# Patient Record
Sex: FEMALE | Race: BLACK OR AFRICAN AMERICAN
[De-identification: names, ages, dates, MRNs, and addresses within clinical notes are randomized per-mention and may not be internally consistent; named-entity substitution may affect disease eponyms.]

---

## 2017-03-13 ENCOUNTER — APPOINTMENT (OUTPATIENT)
Dept: OPHTHALMOLOGY | Facility: CLINIC | Age: 72
End: 2017-03-13

## 2017-03-13 DIAGNOSIS — H53.2 DIPLOPIA: ICD-10-CM

## 2017-03-29 ENCOUNTER — APPOINTMENT (OUTPATIENT)
Dept: OPHTHALMOLOGY | Facility: CLINIC | Age: 72
End: 2017-03-29

## 2017-04-03 ENCOUNTER — APPOINTMENT (OUTPATIENT)
Dept: GASTROENTEROLOGY | Facility: CLINIC | Age: 72
End: 2017-04-03

## 2017-04-03 VITALS
WEIGHT: 111 LBS | HEIGHT: 66 IN | BODY MASS INDEX: 17.84 KG/M2 | TEMPERATURE: 98.1 F | HEART RATE: 107 BPM | OXYGEN SATURATION: 99 % | DIASTOLIC BLOOD PRESSURE: 70 MMHG | SYSTOLIC BLOOD PRESSURE: 150 MMHG

## 2017-04-03 DIAGNOSIS — J34.9 UNSPECIFIED DISORDER OF NOSE AND NASAL SINUSES: ICD-10-CM

## 2017-04-03 DIAGNOSIS — K30 FUNCTIONAL DYSPEPSIA: ICD-10-CM

## 2017-04-03 DIAGNOSIS — Z83.3 FAMILY HISTORY OF DIABETES MELLITUS: ICD-10-CM

## 2017-04-03 DIAGNOSIS — Z83.79 FAMILY HISTORY OF OTHER DISEASES OF THE DIGESTIVE SYSTEM: ICD-10-CM

## 2017-04-03 DIAGNOSIS — Z83.518 FAMILY HISTORY OF OTHER SPECIFIED EYE DISORDER: ICD-10-CM

## 2017-04-03 DIAGNOSIS — Z63.4 DISAPPEARANCE AND DEATH OF FAMILY MEMBER: ICD-10-CM

## 2017-04-03 DIAGNOSIS — Z83.511 FAMILY HISTORY OF GLAUCOMA: ICD-10-CM

## 2017-04-03 DIAGNOSIS — Z80.9 FAMILY HISTORY OF MALIGNANT NEOPLASM, UNSPECIFIED: ICD-10-CM

## 2017-04-03 RX ORDER — OXCARBAZEPINE 600 MG/1
600 TABLET, FILM COATED ORAL
Refills: 0 | Status: ACTIVE | COMMUNITY

## 2017-04-03 RX ORDER — OXYCODONE AND ACETAMINOPHEN 5; 325 MG/1; MG/1
5-325 TABLET ORAL
Refills: 0 | Status: ACTIVE | COMMUNITY

## 2017-04-03 SDOH — SOCIAL STABILITY - SOCIAL INSECURITY: DISSAPEARANCE AND DEATH OF FAMILY MEMBER: Z63.4

## 2017-04-05 ENCOUNTER — OTHER (OUTPATIENT)
Age: 72
End: 2017-04-05

## 2017-04-11 ENCOUNTER — FORM ENCOUNTER (OUTPATIENT)
Age: 72
End: 2017-04-11

## 2017-04-12 ENCOUNTER — APPOINTMENT (OUTPATIENT)
Dept: NUCLEAR MEDICINE | Facility: HOSPITAL | Age: 72
End: 2017-04-12

## 2017-04-12 ENCOUNTER — OUTPATIENT (OUTPATIENT)
Dept: OUTPATIENT SERVICES | Facility: HOSPITAL | Age: 72
LOS: 1 days | End: 2017-04-12

## 2017-04-12 DIAGNOSIS — K80.20 CALCULUS OF GALLBLADDER WITHOUT CHOLECYSTITIS WITHOUT OBSTRUCTION: ICD-10-CM

## 2017-04-27 ENCOUNTER — APPOINTMENT (OUTPATIENT)
Dept: GASTROENTEROLOGY | Facility: AMBULATORY MEDICAL SERVICES | Age: 72
End: 2017-04-27

## 2017-06-08 ENCOUNTER — APPOINTMENT (OUTPATIENT)
Dept: GASTROENTEROLOGY | Facility: AMBULATORY MEDICAL SERVICES | Age: 72
End: 2017-06-08

## 2017-06-26 ENCOUNTER — APPOINTMENT (OUTPATIENT)
Dept: GASTROENTEROLOGY | Facility: CLINIC | Age: 72
End: 2017-06-26

## 2017-06-26 ENCOUNTER — APPOINTMENT (OUTPATIENT)
Dept: OPHTHALMOLOGY | Facility: CLINIC | Age: 72
End: 2017-06-26

## 2017-06-26 RX ORDER — ASPIRIN 81 MG/1
81 TABLET, CHEWABLE ORAL
Qty: 30 | Refills: 0 | Status: ACTIVE | COMMUNITY
Start: 2017-02-08

## 2017-06-26 RX ORDER — PREGABALIN 50 MG/1
50 CAPSULE ORAL
Qty: 42 | Refills: 0 | Status: ACTIVE | COMMUNITY
Start: 2017-01-11

## 2017-07-10 ENCOUNTER — APPOINTMENT (OUTPATIENT)
Dept: GASTROENTEROLOGY | Facility: CLINIC | Age: 72
End: 2017-07-10

## 2017-07-10 VITALS
DIASTOLIC BLOOD PRESSURE: 72 MMHG | SYSTOLIC BLOOD PRESSURE: 130 MMHG | HEIGHT: 66 IN | TEMPERATURE: 98.6 F | BODY MASS INDEX: 17.68 KG/M2 | OXYGEN SATURATION: 98 % | HEART RATE: 71 BPM | WEIGHT: 110 LBS

## 2017-08-25 ENCOUNTER — OUTPATIENT (OUTPATIENT)
Dept: OUTPATIENT SERVICES | Facility: HOSPITAL | Age: 72
LOS: 1 days | Discharge: HOME | End: 2017-08-25

## 2017-08-25 ENCOUNTER — APPOINTMENT (OUTPATIENT)
Dept: SPEECH THERAPY | Facility: CLINIC | Age: 72
End: 2017-08-25

## 2017-09-06 ENCOUNTER — APPOINTMENT (OUTPATIENT)
Dept: SPEECH THERAPY | Facility: CLINIC | Age: 72
End: 2017-09-06

## 2017-09-13 ENCOUNTER — APPOINTMENT (OUTPATIENT)
Dept: SPEECH THERAPY | Facility: CLINIC | Age: 72
End: 2017-09-13

## 2017-09-20 ENCOUNTER — APPOINTMENT (OUTPATIENT)
Dept: SPEECH THERAPY | Facility: CLINIC | Age: 72
End: 2017-09-20

## 2017-09-27 ENCOUNTER — APPOINTMENT (OUTPATIENT)
Dept: SPEECH THERAPY | Facility: CLINIC | Age: 72
End: 2017-09-27

## 2017-10-02 ENCOUNTER — RX RENEWAL (OUTPATIENT)
Age: 72
End: 2017-10-02

## 2017-10-04 ENCOUNTER — APPOINTMENT (OUTPATIENT)
Dept: SPEECH THERAPY | Facility: CLINIC | Age: 72
End: 2017-10-04

## 2017-10-05 DIAGNOSIS — R49.0 DYSPHONIA: ICD-10-CM

## 2017-10-09 ENCOUNTER — APPOINTMENT (OUTPATIENT)
Dept: GASTROENTEROLOGY | Facility: CLINIC | Age: 72
End: 2017-10-09
Payer: MEDICARE

## 2017-10-09 VITALS
OXYGEN SATURATION: 98 % | SYSTOLIC BLOOD PRESSURE: 150 MMHG | DIASTOLIC BLOOD PRESSURE: 70 MMHG | TEMPERATURE: 98.5 F | HEART RATE: 84 BPM | HEIGHT: 66 IN | BODY MASS INDEX: 18.64 KG/M2 | WEIGHT: 116 LBS

## 2017-10-09 DIAGNOSIS — M26.629 ARTHRALGIA OF TEMPOROMANDIBULAR JOINT,: ICD-10-CM

## 2017-10-09 PROCEDURE — 99214 OFFICE O/P EST MOD 30 MIN: CPT

## 2017-10-11 ENCOUNTER — APPOINTMENT (OUTPATIENT)
Dept: SPEECH THERAPY | Facility: CLINIC | Age: 72
End: 2017-10-11

## 2017-10-17 ENCOUNTER — OUTPATIENT (OUTPATIENT)
Dept: OUTPATIENT SERVICES | Facility: HOSPITAL | Age: 72
LOS: 1 days | End: 2017-10-17
Payer: MEDICARE

## 2017-10-17 DIAGNOSIS — G35 MULTIPLE SCLEROSIS: ICD-10-CM

## 2017-10-17 LAB
CLARITY CSF: CLEAR — SIGNIFICANT CHANGE UP
COLOR CSF: COLORLESS — SIGNIFICANT CHANGE UP
GLUCOSE CSF-MCNC: 62 MG/DL — SIGNIFICANT CHANGE UP (ref 40–70)
GRAM STN CSF: SIGNIFICANT CHANGE UP
NRBC NFR CSF: < 1 CELL/UL — SIGNIFICANT CHANGE UP (ref 0–5)
PROT CSF-MCNC: 28.3 MG/DL — SIGNIFICANT CHANGE UP (ref 15–45)
RBC # CSF: 4 CELL/UL — HIGH (ref 0–0)
SPECIMEN SOURCE: SIGNIFICANT CHANGE UP
XANTHOCHROMIA: SIGNIFICANT CHANGE UP

## 2017-10-17 PROCEDURE — 62270 DX LMBR SPI PNXR: CPT

## 2017-10-17 PROCEDURE — 77003 FLUOROGUIDE FOR SPINE INJECT: CPT | Mod: 26,GC

## 2017-10-18 ENCOUNTER — APPOINTMENT (OUTPATIENT)
Dept: SPEECH THERAPY | Facility: CLINIC | Age: 72
End: 2017-10-18

## 2017-10-19 DIAGNOSIS — R53.1 WEAKNESS: ICD-10-CM

## 2017-10-19 LAB — ACE SERPL-CCNC: 37 U/L — SIGNIFICANT CHANGE UP (ref 14–82)

## 2017-10-22 LAB — BACTERIA CSF CULT: SIGNIFICANT CHANGE UP

## 2017-10-23 ENCOUNTER — APPOINTMENT (OUTPATIENT)
Dept: SPEECH THERAPY | Facility: HOSPITAL | Age: 72
End: 2017-10-23
Payer: MEDICARE

## 2017-10-23 ENCOUNTER — APPOINTMENT (OUTPATIENT)
Dept: RADIOLOGY | Facility: HOSPITAL | Age: 72
End: 2017-10-23
Payer: MEDICARE

## 2017-10-23 ENCOUNTER — OUTPATIENT (OUTPATIENT)
Dept: OUTPATIENT SERVICES | Facility: HOSPITAL | Age: 72
LOS: 1 days | End: 2017-10-23

## 2017-10-23 DIAGNOSIS — R13.10 DYSPHAGIA, UNSPECIFIED: ICD-10-CM

## 2017-10-23 PROCEDURE — 74230 X-RAY XM SWLNG FUNCJ C+: CPT | Mod: 26

## 2017-10-24 LAB
MISCELLANEOUS - CHEM: SIGNIFICANT CHANGE UP
MISCELLANEOUS - CHEM: SIGNIFICANT CHANGE UP
OLIGOCLONAL BANDS CSF ELPH-IMP: SIGNIFICANT CHANGE UP

## 2017-11-01 ENCOUNTER — APPOINTMENT (OUTPATIENT)
Dept: SPEECH THERAPY | Facility: CLINIC | Age: 72
End: 2017-11-01

## 2017-11-08 ENCOUNTER — APPOINTMENT (OUTPATIENT)
Dept: SPEECH THERAPY | Facility: CLINIC | Age: 72
End: 2017-11-08

## 2017-11-14 DIAGNOSIS — R13.13 DYSPHAGIA, PHARYNGEAL PHASE: ICD-10-CM

## 2017-11-15 ENCOUNTER — APPOINTMENT (OUTPATIENT)
Dept: SPEECH THERAPY | Facility: CLINIC | Age: 72
End: 2017-11-15

## 2017-11-29 ENCOUNTER — APPOINTMENT (OUTPATIENT)
Dept: SPEECH THERAPY | Facility: CLINIC | Age: 72
End: 2017-11-29

## 2017-12-06 ENCOUNTER — APPOINTMENT (OUTPATIENT)
Dept: SPEECH THERAPY | Facility: CLINIC | Age: 72
End: 2017-12-06

## 2017-12-20 ENCOUNTER — APPOINTMENT (OUTPATIENT)
Dept: SPEECH THERAPY | Facility: CLINIC | Age: 72
End: 2017-12-20

## 2018-01-03 ENCOUNTER — APPOINTMENT (OUTPATIENT)
Dept: SPEECH THERAPY | Facility: CLINIC | Age: 73
End: 2018-01-03

## 2018-01-07 ENCOUNTER — EMERGENCY (EMERGENCY)
Facility: HOSPITAL | Age: 73
LOS: 0 days | Discharge: HOME | End: 2018-01-07
Attending: EMERGENCY MEDICINE
Payer: MEDICARE

## 2018-01-07 VITALS
SYSTOLIC BLOOD PRESSURE: 175 MMHG | DIASTOLIC BLOOD PRESSURE: 78 MMHG | HEART RATE: 67 BPM | OXYGEN SATURATION: 98 % | TEMPERATURE: 97 F | RESPIRATION RATE: 15 BRPM

## 2018-01-07 VITALS
DIASTOLIC BLOOD PRESSURE: 67 MMHG | TEMPERATURE: 98 F | OXYGEN SATURATION: 99 % | SYSTOLIC BLOOD PRESSURE: 146 MMHG | RESPIRATION RATE: 18 BRPM | HEART RATE: 64 BPM | WEIGHT: 113.98 LBS | HEIGHT: 66 IN

## 2018-01-07 DIAGNOSIS — R42 DIZZINESS AND GIDDINESS: ICD-10-CM
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
## 2018-01-07 DIAGNOSIS — S09.90XA UNSPECIFIED INJURY OF HEAD, INITIAL ENCOUNTER: ICD-10-CM
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
## 2018-01-07 PROCEDURE — 99284 EMERGENCY DEPT VISIT MOD MDM: CPT

## 2018-01-07 PROCEDURE — 70450 CT HEAD/BRAIN W/O DYE: CPT | Mod: 26

## 2018-01-07 NOTE — ED PROVIDER NOTE - PHYSICAL EXAMINATION
Gen: Alert, NAD Head: NC, AT, PERRL, EOMI, normal lids/conjunctiva ENT:normal hearing, patent oropharynx without erythema/exudate, uvula midline Neck: +supple, no tenderness/meningismus/JVD, +Trachea midline Pulm: Bilateral BS, normal resp effort, no wheeze/stridor/retractions CV: RRR, no M/R/G, +dist pulses Abd: soft, NT/ND, +BS, no hepatosplenomegaly Mskel: no edema/erythema/cyanosis Skin: no rash Neuro: AAOx3, no sensory/motor deficits, CN 2-12 intact

## 2018-01-07 NOTE — ED ADULT TRIAGE NOTE - CHIEF COMPLAINT QUOTE
c/o dizziness s/p fell out of bed hit forehead on nightstand denies loc denies n/v no obvious injury noted to area denies pain at present denies anticoagulation use

## 2018-01-07 NOTE — ED ADULT NURSE REASSESSMENT NOTE - NS ED NURSE REASSESS COMMENT FT1
Pt denies dizziness at this time; noted with steady gait. Family member with pt. States she feels better.

## 2018-01-07 NOTE — ED ADULT NURSE NOTE - CHPI ED SYMPTOMS NEG
no tingling/no abrasion/no bleeding/no fever/no weakness/no confusion/no deformity/no vomiting/no loss of consciousness/no numbness

## 2018-01-07 NOTE — ED PROVIDER NOTE - MEDICAL DECISION MAKING DETAILS
Patient pnrests with head injury.  pending Ct.  Patient signed out to incoming physician.  All decisions regarding the progression of care will be made at their discretion.

## 2018-01-07 NOTE — ED PROVIDER NOTE - PROGRESS NOTE DETAILS
endorsed by dr. austin pending CT. CT scan demonstrates no acute pathology. pt well appeairng Discussed results and outcome of testing with the patient.  Patient given copy of available results. Patient advised to please follow up with their PMD within the next 24 hours and return to the Emergency Department for worsening symptoms or any other concerns.

## 2018-01-07 NOTE — ED PROVIDER NOTE - OBJECTIVE STATEMENT
Pertinent PMH/PSH/FHx/SHx and Review of Systems contained within:  Patient presents to the ED for head injury.  no PMH.  Patient states she "rolled over in her sleep and struck her head against the night stand and woke up."  VSS.  occcured early this morning.  now through the day "feels foggy and uneasy." As interpreted by ED physician, ECG is NSR with normal intervals/axis, no changes in QRS, no ST/T changes. Non toxic.  Well appearing. No aggravating or relieving factors. No other pertinent PMH.  No other pertinent PSH.  No other pertinent FHx.  Patient denies EtOH/tobacco/illicit substance use. No fever/chills, No photophobia/eye pain/changes in vision, No ear pain/sore throat/dysphagia, No chest pain/palpitations, no SOB/cough/wheeze/stridor, No abdominal pain, No N/V/D, no dysuria/frequency/discharge, No neck/back pain, no rash, no focal changes in neurological status/function.

## 2018-01-31 ENCOUNTER — APPOINTMENT (OUTPATIENT)
Dept: SPEECH THERAPY | Facility: CLINIC | Age: 73
End: 2018-01-31

## 2018-02-05 ENCOUNTER — RX RENEWAL (OUTPATIENT)
Age: 73
End: 2018-02-05

## 2018-02-05 RX ORDER — ESCITALOPRAM OXALATE 10 MG/1
10 TABLET ORAL
Qty: 30 | Refills: 3 | Status: ACTIVE | COMMUNITY
Start: 2017-07-10 | End: 1900-01-01

## 2018-04-09 ENCOUNTER — RX RENEWAL (OUTPATIENT)
Age: 73
End: 2018-04-09

## 2018-04-23 ENCOUNTER — APPOINTMENT (OUTPATIENT)
Dept: GASTROENTEROLOGY | Facility: CLINIC | Age: 73
End: 2018-04-23
Payer: MEDICARE

## 2018-04-23 VITALS
OXYGEN SATURATION: 98 % | WEIGHT: 116 LBS | SYSTOLIC BLOOD PRESSURE: 135 MMHG | HEIGHT: 66 IN | TEMPERATURE: 98.5 F | BODY MASS INDEX: 18.64 KG/M2 | DIASTOLIC BLOOD PRESSURE: 73 MMHG | RESPIRATION RATE: 16 BRPM | HEART RATE: 84 BPM

## 2018-04-23 DIAGNOSIS — Z12.11 ENCOUNTER FOR SCREENING FOR MALIGNANT NEOPLASM OF COLON: ICD-10-CM

## 2018-04-23 PROCEDURE — 99214 OFFICE O/P EST MOD 30 MIN: CPT

## 2018-04-23 RX ORDER — GABAPENTIN 100 MG
100 TABLET ORAL
Refills: 0 | Status: ACTIVE | COMMUNITY

## 2018-04-23 RX ORDER — CELECOXIB 50 MG/1
CAPSULE ORAL
Refills: 0 | Status: ACTIVE | COMMUNITY

## 2018-04-23 RX ORDER — SODIUM SULFATE, POTASSIUM SULFATE, MAGNESIUM SULFATE 17.5; 3.13; 1.6 G/ML; G/ML; G/ML
17.5-3.13-1.6 SOLUTION, CONCENTRATE ORAL
Qty: 1 | Refills: 0 | Status: ACTIVE | COMMUNITY
Start: 2018-04-23 | End: 1900-01-01

## 2018-05-10 ENCOUNTER — APPOINTMENT (OUTPATIENT)
Dept: GASTROENTEROLOGY | Facility: AMBULATORY MEDICAL SERVICES | Age: 73
End: 2018-05-10
Payer: MEDICARE

## 2018-05-10 PROCEDURE — 45385 COLONOSCOPY W/LESION REMOVAL: CPT | Mod: PT

## 2018-05-10 PROCEDURE — 45381 COLONOSCOPY SUBMUCOUS NJX: CPT | Mod: PT

## 2018-05-30 ENCOUNTER — APPOINTMENT (OUTPATIENT)
Dept: GASTROENTEROLOGY | Facility: CLINIC | Age: 73
End: 2018-05-30
Payer: MEDICARE

## 2018-05-30 VITALS
RESPIRATION RATE: 16 BRPM | TEMPERATURE: 98.3 F | BODY MASS INDEX: 18.64 KG/M2 | WEIGHT: 116 LBS | HEIGHT: 66 IN | OXYGEN SATURATION: 99 % | DIASTOLIC BLOOD PRESSURE: 73 MMHG | SYSTOLIC BLOOD PRESSURE: 138 MMHG | HEART RATE: 71 BPM

## 2018-05-30 DIAGNOSIS — E87.1 HYPO-OSMOLALITY AND HYPONATREMIA: ICD-10-CM

## 2018-05-30 PROCEDURE — 99214 OFFICE O/P EST MOD 30 MIN: CPT

## 2018-05-30 RX ORDER — GABAPENTIN 100 MG/1
100 CAPSULE ORAL
Qty: 90 | Refills: 0 | Status: ACTIVE | COMMUNITY
Start: 2018-01-18

## 2018-05-30 RX ORDER — ESCITALOPRAM OXALATE 20 MG/1
20 TABLET ORAL
Qty: 90 | Refills: 0 | Status: ACTIVE | COMMUNITY
Start: 2018-02-27

## 2018-05-30 RX ORDER — SULFAMETHOXAZOLE AND TRIMETHOPRIM 800; 160 MG/1; MG/1
800-160 TABLET ORAL
Qty: 20 | Refills: 0 | Status: ACTIVE | COMMUNITY
Start: 2018-05-02

## 2018-05-30 RX ORDER — CELECOXIB 100 MG/1
100 CAPSULE ORAL
Qty: 30 | Refills: 0 | Status: ACTIVE | COMMUNITY
Start: 2018-03-19

## 2018-05-30 RX ORDER — SIMVASTATIN 10 MG/1
10 TABLET, FILM COATED ORAL
Qty: 90 | Refills: 0 | Status: ACTIVE | COMMUNITY
Start: 2018-01-04

## 2018-07-23 ENCOUNTER — APPOINTMENT (OUTPATIENT)
Dept: GASTROENTEROLOGY | Facility: CLINIC | Age: 73
End: 2018-07-23
Payer: MEDICARE

## 2018-07-23 VITALS
BODY MASS INDEX: 18.48 KG/M2 | DIASTOLIC BLOOD PRESSURE: 85 MMHG | TEMPERATURE: 98.5 F | WEIGHT: 115 LBS | HEIGHT: 66 IN | RESPIRATION RATE: 16 BRPM | OXYGEN SATURATION: 96 % | SYSTOLIC BLOOD PRESSURE: 155 MMHG | HEART RATE: 84 BPM

## 2018-07-23 PROCEDURE — 99214 OFFICE O/P EST MOD 30 MIN: CPT

## 2018-10-22 ENCOUNTER — APPOINTMENT (OUTPATIENT)
Dept: GASTROENTEROLOGY | Facility: CLINIC | Age: 73
End: 2018-10-22
Payer: MEDICARE

## 2018-10-22 VITALS
BODY MASS INDEX: 21.53 KG/M2 | DIASTOLIC BLOOD PRESSURE: 82 MMHG | SYSTOLIC BLOOD PRESSURE: 150 MMHG | RESPIRATION RATE: 16 BRPM | HEIGHT: 66 IN | WEIGHT: 134 LBS | TEMPERATURE: 97.8 F | HEART RATE: 92 BPM | OXYGEN SATURATION: 98 %

## 2018-10-22 PROCEDURE — 99214 OFFICE O/P EST MOD 30 MIN: CPT

## 2018-11-26 ENCOUNTER — RX RENEWAL (OUTPATIENT)
Age: 73
End: 2018-11-26

## 2019-03-20 ENCOUNTER — EMERGENCY (EMERGENCY)
Facility: HOSPITAL | Age: 74
LOS: 0 days | Discharge: HOME | End: 2019-03-20
Attending: EMERGENCY MEDICINE
Payer: MEDICARE

## 2019-03-20 VITALS
DIASTOLIC BLOOD PRESSURE: 77 MMHG | TEMPERATURE: 98 F | RESPIRATION RATE: 18 BRPM | OXYGEN SATURATION: 99 % | HEART RATE: 88 BPM | HEIGHT: 66 IN | WEIGHT: 134.92 LBS | SYSTOLIC BLOOD PRESSURE: 178 MMHG
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
## 2019-03-20 DIAGNOSIS — S61.512A LACERATION WITHOUT FOREIGN BODY OF LEFT WRIST, INITIAL ENCOUNTER: ICD-10-CM
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
## 2019-03-20 PROCEDURE — 73110 X-RAY EXAM OF WRIST: CPT | Mod: 26,LT

## 2019-03-20 PROCEDURE — 12001 RPR S/N/AX/GEN/TRNK 2.5CM/<: CPT

## 2019-03-20 PROCEDURE — 99283 EMERGENCY DEPT VISIT LOW MDM: CPT | Mod: 25

## 2019-03-20 RX ORDER — SIMVASTATIN 20 MG/1
0 TABLET, FILM COATED ORAL
Qty: 0 | Refills: 0 | COMMUNITY

## 2019-03-20 RX ORDER — OMEPRAZOLE 10 MG/1
1 CAPSULE, DELAYED RELEASE ORAL
Qty: 0 | Refills: 0 | COMMUNITY

## 2019-03-20 RX ORDER — TETANUS TOXOID, REDUCED DIPHTHERIA TOXOID AND ACELLULAR PERTUSSIS VACCINE, ADSORBED 5; 2.5; 8; 8; 2.5 [IU]/.5ML; [IU]/.5ML; UG/.5ML; UG/.5ML; UG/.5ML
0.5 SUSPENSION INTRAMUSCULAR ONCE
Qty: 0 | Refills: 0 | Status: COMPLETED | OUTPATIENT
Start: 2019-03-20 | End: 2019-03-20

## 2019-03-20 RX ORDER — OXYCODONE AND ACETAMINOPHEN 5; 325 MG/1; MG/1
1 TABLET ORAL ONCE
Qty: 0 | Refills: 0 | Status: DISCONTINUED | OUTPATIENT
Start: 2019-03-20 | End: 2019-03-20

## 2019-03-20 RX ORDER — ESCITALOPRAM OXALATE 10 MG/1
0 TABLET, FILM COATED ORAL
Qty: 0 | Refills: 0 | COMMUNITY

## 2019-03-20 RX ADMIN — TETANUS TOXOID, REDUCED DIPHTHERIA TOXOID AND ACELLULAR PERTUSSIS VACCINE, ADSORBED 0.5 MILLILITER(S): 5; 2.5; 8; 8; 2.5 SUSPENSION INTRAMUSCULAR at 18:35

## 2019-03-20 NOTE — ED ADULT NURSE NOTE - OBJECTIVE STATEMENT
73 y.o female with a hx of trigeminal neuralgia and transient hypertension complains of laceration on the left wrist after washing dishes, laceration was from broken glass. patient is able to move bend and flex fingers. Denies numbness and tingling.

## 2019-03-20 NOTE — ED ADULT NURSE NOTE - NS ED NURSE DC INFO COMPLEXITY
Patient asked questions/Returned Demonstration/Simple: Patient demonstrates quick and easy understanding

## 2019-03-20 NOTE — ED ADULT NURSE NOTE - NSIMPLEMENTINTERV_GEN_ALL_ED
Implemented All Universal Safety Interventions:  [place] to call system. Call bell, personal items and telephone within reach. Instruct patient to call for assistance. Room bathroom lighting operational. Non-slip footwear when patient is off stretcher. Physically safe environment: no spills, clutter or unnecessary equipment. Stretcher in lowest position, wheels locked, appropriate side rails in place.

## 2019-03-20 NOTE — ED PROVIDER NOTE - PHYSICAL EXAMINATION
Gen: AAOx3, NAD, sitting comfortably in stretcher  Head: ncat, perrla, eomi b/l  Neck: supple, no lymphadenopathy, no midline deviation  Heart: rrr, no m/r/g  Lungs: CTA b/l, no rales/ronchi/wheezes  Abd: soft, nontender, non-distended, no rebound or guarding  Ext: no clubbing/cyanosis/edema  Neuro: sensation and muscle strength intact b/l, steady gait, normal rom of all digits of L hand and wrist, no active bleeding, no fb in wound after washout, normal cap refill throughout hand, normal color, sensation intact throughout all fingers and wrist  derm: 2 cm L wrist laceration anterior ulnar aspect, superficial, only sub-q visible, no muscles or tendons

## 2019-03-20 NOTE — ED PROVIDER NOTE - CARE PROVIDER_API CALL
Johnny Palmer (MD)  Plastic Surgery  [address] [place] [address], [address]  [place], [place] [id number]  Phone: [phone]  Fax: [phone]  Follow Up Time: 4-6 Days

## 2019-03-20 NOTE — ED PROVIDER NOTE - OBJECTIVE STATEMENT
74 yo F with laceration to L wrist s/p washing dishes.  Pt. cut wrist on piece of glass accidentally that she didn't know was already chipped.  Pt. denies other injury.  Incident happened within the last few hours.  No other complaints or trauma, no active bleeding currently.  ROS: negative for fever, cough, headache, chest pain, shortness of breath, abd pain, nausea, vomiting, diarrhea, rash, paresthesia, and weakness--all other systems reviewed are negative.   PMH: negative; Meds: Denies; SH: Denies smoking/drinking/drug use

## 2019-03-20 NOTE — ED PROVIDER NOTE - CLINICAL SUMMARY MEDICAL DECISION MAKING FREE TEXT BOX
74 yo F with L wrist lac, uncomplicated  -xray L wrist r/o FB  -washout and repair, d/c with pcp f/u

## 2019-03-20 NOTE — ED PROVIDER NOTE - PROGRESS NOTE DETAILS
Results reported to patient--grossly benign, xr show no bony trauma or FB  Pt. reports feeling better after meds, lac repaired  pt. agrees to f/u with primary care outpt.  pt. understands to return to ED if symptoms worsen; will d/c

## 2019-04-22 ENCOUNTER — APPOINTMENT (OUTPATIENT)
Dept: GASTROENTEROLOGY | Facility: CLINIC | Age: 74
End: 2019-04-22

## 2019-05-13 ENCOUNTER — APPOINTMENT (OUTPATIENT)
Dept: GASTROENTEROLOGY | Facility: CLINIC | Age: 74
End: 2019-05-13
Payer: MEDICARE

## 2019-05-13 VITALS
SYSTOLIC BLOOD PRESSURE: 130 MMHG | HEIGHT: 66 IN | BODY MASS INDEX: 22.66 KG/M2 | DIASTOLIC BLOOD PRESSURE: 70 MMHG | HEART RATE: 68 BPM | OXYGEN SATURATION: 98 % | TEMPERATURE: 98.6 F | WEIGHT: 141 LBS

## 2019-05-13 PROCEDURE — 99214 OFFICE O/P EST MOD 30 MIN: CPT

## 2019-05-13 RX ORDER — NEBIVOLOL HYDROCHLORIDE 10 MG/1
10 TABLET ORAL
Refills: 0 | Status: ACTIVE | COMMUNITY

## 2019-05-13 RX ORDER — OMEPRAZOLE 20 MG/1
20 CAPSULE, DELAYED RELEASE ORAL DAILY
Qty: 30 | Refills: 3 | Status: DISCONTINUED | COMMUNITY
Start: 2017-04-05 | End: 2019-05-13

## 2019-05-13 RX ORDER — SODIUM SULFATE, POTASSIUM SULFATE, MAGNESIUM SULFATE 17.5; 3.13; 1.6 G/ML; G/ML; G/ML
17.5-3.13-1.6 SOLUTION, CONCENTRATE ORAL
Qty: 1 | Refills: 0 | Status: ACTIVE | COMMUNITY
Start: 2019-05-13 | End: 1900-01-01

## 2019-05-13 NOTE — PHYSICAL EXAM
[General Appearance - In No Acute Distress] : in no acute distress [General Appearance - Alert] : alert [Extraocular Movements] : extraocular movements were intact [Sclera] : the sclera and conjunctiva were normal [PERRL With Normal Accommodation] : pupils were equal in size, round, and reactive to light [Oropharynx] : the oropharynx was normal [Outer Ear] : the ears and nose were normal in appearance [Neck Cervical Mass (___cm)] : no neck mass was observed [Neck Appearance] : the appearance of the neck was normal [Jugular Venous Distention Increased] : there was no jugular-venous distention [Thyroid Diffuse Enlargement] : the thyroid was not enlarged [Thyroid Nodule] : there were no palpable thyroid nodules [Auscultation Breath Sounds / Voice Sounds] : lungs were clear to auscultation bilaterally [Heart Rate And Rhythm] : heart rate was normal and rhythm regular [Heart Sounds] : normal S1 and S2 [Heart Sounds Gallop] : no gallops [Murmurs] : no murmurs [Heart Sounds Pericardial Friction Rub] : no pericardial rub [Bowel Sounds] : normal bowel sounds [Abdomen Soft] : soft [Abdomen Tenderness] : non-tender [] : no hepato-splenomegaly [Abdomen Mass (___ Cm)] : no abdominal mass palpated [Abnormal Walk] : normal gait [Nail Clubbing] : no clubbing  or cyanosis of the fingernails [No CVA Tenderness] : no ~M costovertebral angle tenderness [No Spinal Tenderness] : no spinal tenderness [Oriented To Time, Place, And Person] : oriented to person, place, and time [Musculoskeletal - Swelling] : no joint swelling seen [Motor Tone] : muscle strength and tone were normal [Impaired Insight] : insight and judgment were intact [Affect] : the affect was normal

## 2019-05-13 NOTE — HISTORY OF PRESENT ILLNESS
[FreeTextEntry1] : The patient is a 73-year-old female who is status post colonoscopy and polypectomy. She had a sessile polyp in the sigmoid which was removed with snare cautery. 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 This was found to be at least 2 mm away from the cautery line.\par The patient has infrequent episodes of some left-sided abdominal pain. Her bowel movements are fairly regular. She does have bouts of some constipation.\par She has a history of gallstones but they appear to be asymptomatic.\par She was started on Remeron several days ago. Has gained 20 lbs since July.\par

## 2019-05-13 NOTE — ASSESSMENT
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 CEA level will be drawn. The patient will need a followup colonoscopy and biopsy of the area.

## 2019-05-19 ENCOUNTER — RX RENEWAL (OUTPATIENT)
Age: 74
End: 2019-05-19

## 2019-06-12 ENCOUNTER — FORM ENCOUNTER (OUTPATIENT)
Age: 74
End: 2019-06-12

## 2019-06-13 ENCOUNTER — OUTPATIENT (OUTPATIENT)
Dept: OUTPATIENT SERVICES | Facility: HOSPITAL | Age: 74
LOS: 1 days | End: 2019-06-13
Payer: MEDICARE

## 2019-06-13 ENCOUNTER — APPOINTMENT (OUTPATIENT)
Dept: GASTROENTEROLOGY | Facility: AMBULATORY MEDICAL SERVICES | Age: 74
End: 2019-06-13
Payer: MEDICARE

## 2019-06-13 ENCOUNTER — APPOINTMENT (OUTPATIENT)
Dept: CT IMAGING | Facility: CLINIC | Age: 74
End: 2019-06-13
Payer: MEDICARE

## 2019-06-13 DIAGNOSIS — Z00.8 ENCOUNTER FOR OTHER GENERAL EXAMINATION: ICD-10-CM

## 2019-06-13 PROCEDURE — 74261 CT COLONOGRAPHY DX: CPT

## 2019-06-13 PROCEDURE — 74261 CT COLONOGRAPHY DX: CPT | Mod: 26

## 2019-06-13 PROCEDURE — G0105: CPT | Mod: 53

## 2019-07-03 ENCOUNTER — APPOINTMENT (OUTPATIENT)
Dept: GASTROENTEROLOGY | Facility: CLINIC | Age: 74
End: 2019-07-03
Payer: MEDICARE

## 2019-07-03 VITALS
DIASTOLIC BLOOD PRESSURE: 80 MMHG | HEART RATE: 82 BPM | OXYGEN SATURATION: 99 % | WEIGHT: 140 LBS | SYSTOLIC BLOOD PRESSURE: 140 MMHG | TEMPERATURE: 98 F | HEIGHT: 65 IN | BODY MASS INDEX: 23.32 KG/M2

## 2019-07-03 DIAGNOSIS — C80.1 MALIGNANT (PRIMARY) NEOPLASM, UNSPECIFIED: ICD-10-CM

## 2019-07-03 PROCEDURE — 99213 OFFICE O/P EST LOW 20 MIN: CPT

## 2019-07-03 NOTE — HISTORY OF PRESENT ILLNESS
[FreeTextEntry1] : The patient is a 73-year-old female who is status post colonoscopy and polypectomy in 2018. She had a sessile polyp in the sigmoid which was removed with snare cautery. 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 This was found to be at least 2 mm away from the cautery line.\par The patient has infrequent episodes of some left-sided abdominal pain. Her bowel movements are fairly regular. She does have bouts of some constipation. A followup colonoscopy was attempted in June but was incomplete. The scope was not able to be advanced beyond the sigmoid colon. A virtual colonoscopy was ordered and did not reveal any polyps or masses.\par She has a history of gallstones but they appear to be asymptomatic.\par She was started on Remeron several days ago. Has gained 20 lbs since July.\par

## 2019-07-03 NOTE — PHYSICAL EXAM
[General Appearance - In No Acute Distress] : in no acute distress [General Appearance - Alert] : alert [Sclera] : the sclera and conjunctiva were normal [PERRL With Normal Accommodation] : pupils were equal in size, round, and reactive to light [Extraocular Movements] : extraocular movements were intact [Outer Ear] : the ears and nose were normal in appearance [Oropharynx] : the oropharynx was normal [Neck Appearance] : the appearance of the neck was normal [Neck Cervical Mass (___cm)] : no neck mass was observed [Jugular Venous Distention Increased] : there was no jugular-venous distention [Thyroid Diffuse Enlargement] : the thyroid was not enlarged [Thyroid Nodule] : there were no palpable thyroid nodules [Auscultation Breath Sounds / Voice Sounds] : lungs were clear to auscultation bilaterally [Heart Rate And Rhythm] : heart rate was normal and rhythm regular [Heart Sounds] : normal S1 and S2 [Heart Sounds Pericardial Friction Rub] : no pericardial rub [Murmurs] : no murmurs [Heart Sounds Gallop] : no gallops [Bowel Sounds] : normal bowel sounds [Abdomen Soft] : soft [] : no hepato-splenomegaly [Abdomen Tenderness] : non-tender [No CVA Tenderness] : no ~M costovertebral angle tenderness [Abdomen Mass (___ Cm)] : no abdominal mass palpated [No Spinal Tenderness] : no spinal tenderness [Abnormal Walk] : normal gait [Nail Clubbing] : no clubbing  or cyanosis of the fingernails [Musculoskeletal - Swelling] : no joint swelling seen [Motor Tone] : muscle strength and tone were normal [Oriented To Time, Place, And Person] : oriented to person, place, and time [Impaired Insight] : insight and judgment were intact [Affect] : the affect was normal

## 2019-07-03 NOTE — REVIEW OF SYSTEMS
[Depression] : depression [As Noted in HPI] : as noted in HPI [Negative] : Heme/Lymph [de-identified] : Memory loss

## 2019-07-03 NOTE — ASSESSMENT
[FreeTextEntry1] : Patient with carcinoma in situ in a polyp. \par She had an incomplete colonoscopy and a normal virtual colonoscopy.\par CEA levelwill be ordered.

## 2019-07-16 LAB — CEA SERPL-MCNC: 2.1 NG/ML

## 2019-09-05 ENCOUNTER — RX RENEWAL (OUTPATIENT)
Age: 74
End: 2019-09-05

## 2019-09-05 RX ORDER — FAMOTIDINE 40 MG/1
40 TABLET, FILM COATED ORAL DAILY
Qty: 30 | Refills: 3 | Status: ACTIVE | COMMUNITY
Start: 2019-05-13 | End: 1900-01-01

## 2019-10-07 ENCOUNTER — APPOINTMENT (OUTPATIENT)
Dept: GASTROENTEROLOGY | Facility: CLINIC | Age: 74
End: 2019-10-07

## 2019-11-04 ENCOUNTER — APPOINTMENT (OUTPATIENT)
Dept: GASTROENTEROLOGY | Facility: CLINIC | Age: 74
End: 2019-11-04
Payer: MEDICARE

## 2019-11-04 VITALS
HEART RATE: 52 BPM | SYSTOLIC BLOOD PRESSURE: 130 MMHG | TEMPERATURE: 98.1 F | HEIGHT: 65 IN | OXYGEN SATURATION: 93 % | BODY MASS INDEX: 22.82 KG/M2 | WEIGHT: 137 LBS | DIASTOLIC BLOOD PRESSURE: 70 MMHG

## 2019-11-04 PROCEDURE — 99213 OFFICE O/P EST LOW 20 MIN: CPT

## 2019-11-04 RX ORDER — DOCUSATE SODIUM 100 MG/1
100 CAPSULE ORAL
Qty: 30 | Refills: 1 | Status: ACTIVE | OUTPATIENT
Start: 2019-11-04

## 2019-11-04 NOTE — REVIEW OF SYSTEMS
[As Noted in HPI] : as noted in HPI [Arthralgias] : arthralgias [Joint Pain] : joint pain [Negative] : Heme/Lymph

## 2019-11-04 NOTE — ASSESSMENT
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 CEA level is normal. CEA level will be repeated in January.

## 2019-11-04 NOTE — HISTORY OF PRESENT ILLNESS
[FreeTextEntry1] : Patient is a 74-year-old female who is status post a colonoscopy in 2018 that revealed a sessile sigmoid polyp that was removed. This was adenomatous with intramucosal carcinoma. The carcinoma did not advance to 2 mm from the cautery. An attempt at colonoscopy in 2019 was incomplete due to redundant colon. Virtual colonoscopy was done that did not reveal residual polyp. CEA level was 2.1 in July.\par Patient does complain of some constipation\par .

## 2019-12-25 LAB — CEA SERPL-MCNC: 1.9 NG/ML

## 2021-03-01 ENCOUNTER — APPOINTMENT (OUTPATIENT)
Dept: GASTROENTEROLOGY | Facility: CLINIC | Age: 76
End: 2021-03-01
Payer: MEDICARE

## 2021-03-01 VITALS
WEIGHT: 129 LBS | TEMPERATURE: 98 F | BODY MASS INDEX: 21.49 KG/M2 | OXYGEN SATURATION: 99 % | DIASTOLIC BLOOD PRESSURE: 80 MMHG | HEIGHT: 65 IN | HEART RATE: 68 BPM | SYSTOLIC BLOOD PRESSURE: 130 MMHG

## 2021-03-01 DIAGNOSIS — R10.13 EPIGASTRIC PAIN: ICD-10-CM

## 2021-03-01 PROCEDURE — 99213 OFFICE O/P EST LOW 20 MIN: CPT | Mod: 25

## 2021-03-01 PROCEDURE — 36415 COLL VENOUS BLD VENIPUNCTURE: CPT

## 2021-03-01 RX ORDER — AMLODIPINE BESYLATE 5 MG/1
5 TABLET ORAL
Qty: 90 | Refills: 0 | Status: ACTIVE | COMMUNITY
Start: 2021-01-21

## 2021-03-01 RX ORDER — MIRTAZAPINE 30 MG/1
30 TABLET, FILM COATED ORAL
Qty: 90 | Refills: 0 | Status: ACTIVE | COMMUNITY
Start: 2020-11-24

## 2021-03-01 NOTE — ASSESSMENT
[FreeTextEntry1] : Patient with symptoms of dyspepsia and epigastric and left upper quadrant burning sensation.  This is not relieved with famotidine.  Omeprazole 40 mg will be given.  She had an endoscopy in 2017 that revealed a small hiatus hernia.\par Patient also has gallstones and a follow-up sonogram will be ordered.\par Patient will be referred for blood work including CEA level.  She did have carcinoma in situ of the sigmoid colon 3 years ago.  A follow-up virtual colonoscopy in 2019 was normal.

## 2021-03-01 NOTE — PHYSICAL EXAM
[General Appearance - Alert] : alert [General Appearance - In No Acute Distress] : in no acute distress [Sclera] : the sclera and conjunctiva were normal [PERRL With Normal Accommodation] : pupils were equal in size, round, and reactive to light [Extraocular Movements] : extraocular movements were intact [Outer Ear] : the ears and nose were normal in appearance [Oropharynx] : the oropharynx was normal [Neck Appearance] : the appearance of the neck was normal [Neck Cervical Mass (___cm)] : no neck mass was observed [Jugular Venous Distention Increased] : there was no jugular-venous distention [Thyroid Diffuse Enlargement] : the thyroid was not enlarged [Thyroid Nodule] : there were no palpable thyroid nodules [Auscultation Breath Sounds / Voice Sounds] : lungs were clear to auscultation bilaterally [Heart Rate And Rhythm] : heart rate was normal and rhythm regular [Heart Sounds] : normal S1 and S2 [Heart Sounds Gallop] : no gallops [Murmurs] : no murmurs [Heart Sounds Pericardial Friction Rub] : no pericardial rub [Full Pulse] : the pedal pulses are present [Edema] : there was no peripheral edema [Bowel Sounds] : normal bowel sounds [Abdomen Soft] : soft [Abdomen Tenderness] : non-tender [] : no hepato-splenomegaly [Abdomen Mass (___ Cm)] : no abdominal mass palpated [No CVA Tenderness] : no ~M costovertebral angle tenderness [No Spinal Tenderness] : no spinal tenderness [Abnormal Walk] : normal gait [Nail Clubbing] : no clubbing  or cyanosis of the fingernails [Musculoskeletal - Swelling] : no joint swelling seen [Motor Tone] : muscle strength and tone were normal [Oriented To Time, Place, And Person] : oriented to person, place, and time [Impaired Insight] : insight and judgment were intact [Affect] : the affect was normal

## 2021-03-01 NOTE — HISTORY OF PRESENT ILLNESS
[FreeTextEntry1] : Patient is a 75-year-old female who is status post a colonoscopy in 2018 that revealed a sessile sigmoid polyp that was removed. This was adenomatous with intramucosal carcinoma. The carcinoma did not advance to 2 mm from the cautery. An attempt at colonoscopy in 2019 was incomplete due to redundant colon. Virtual colonoscopy was done that did not reveal residual polyp. CEA level was 2.1 in July, 2019.\par Patient has been complaining of epigastric and left upper quadrant burning.  This is described as indigestion.  She has had the symptoms for several weeks.  There has been no relief with Pepcid.  She denies any weight loss.\par Patient has a history of cholelithiasis.\par .

## 2021-03-03 LAB
BASOPHILS # BLD AUTO: 0.02 K/UL
BASOPHILS NFR BLD AUTO: 0.2 %
EOSINOPHIL # BLD AUTO: 0.07 K/UL
EOSINOPHIL NFR BLD AUTO: 0.9 %
ERYTHROCYTE [SEDIMENTATION RATE] IN BLOOD BY WESTERGREN METHOD: 12 MM/HR
HCT VFR BLD CALC: 37.2 %
HGB BLD-MCNC: 11.6 G/DL
IMM GRANULOCYTES NFR BLD AUTO: 0.2 %
LYMPHOCYTES # BLD AUTO: 2.88 K/UL
LYMPHOCYTES NFR BLD AUTO: 35.1 %
MAN DIFF?: NORMAL
MCHC RBC-ENTMCNC: 27.6 PG
MCHC RBC-ENTMCNC: 31.2 GM/DL
MCV RBC AUTO: 88.4 FL
MONOCYTES # BLD AUTO: 0.42 K/UL
MONOCYTES NFR BLD AUTO: 5.1 %
NEUTROPHILS # BLD AUTO: 4.79 K/UL
NEUTROPHILS NFR BLD AUTO: 58.5 %
PLATELET # BLD AUTO: 251 K/UL
RBC # BLD: 4.21 M/UL
RBC # FLD: 13.1 %
WBC # FLD AUTO: 8.2 K/UL

## 2021-03-05 LAB
ALBUMIN SERPL ELPH-MCNC: 4.5 G/DL
ALP BLD-CCNC: 119 U/L
ALT SERPL-CCNC: 13 U/L
ANION GAP SERPL CALC-SCNC: 12 MMOL/L
AST SERPL-CCNC: 18 U/L
BILIRUB SERPL-MCNC: 0.2 MG/DL
BUN SERPL-MCNC: 18 MG/DL
CALCIUM SERPL-MCNC: 9.8 MG/DL
CEA SERPL-MCNC: 2.1 NG/ML
CHLORIDE SERPL-SCNC: 98 MMOL/L
CO2 SERPL-SCNC: 26 MMOL/L
CREAT SERPL-MCNC: 1 MG/DL
GLUCOSE SERPL-MCNC: 118 MG/DL
POTASSIUM SERPL-SCNC: 4.4 MMOL/L
PROT SERPL-MCNC: 7.6 G/DL
SODIUM SERPL-SCNC: 136 MMOL/L
TSH SERPL-ACNC: 1.74 UIU/ML

## 2021-03-19 LAB — HEMOCCULT STL QL IA: NEGATIVE

## 2021-05-31 ENCOUNTER — RX RENEWAL (OUTPATIENT)
Age: 76
End: 2021-05-31

## 2021-06-09 ENCOUNTER — APPOINTMENT (OUTPATIENT)
Dept: GASTROENTEROLOGY | Facility: CLINIC | Age: 76
End: 2021-06-09
Payer: MEDICARE

## 2021-06-09 VITALS
HEIGHT: 66 IN | HEART RATE: 79 BPM | DIASTOLIC BLOOD PRESSURE: 65 MMHG | TEMPERATURE: 98 F | BODY MASS INDEX: 20.41 KG/M2 | SYSTOLIC BLOOD PRESSURE: 124 MMHG | OXYGEN SATURATION: 94 % | WEIGHT: 127 LBS

## 2021-06-09 PROCEDURE — 99214 OFFICE O/P EST MOD 30 MIN: CPT

## 2021-06-09 NOTE — HISTORY OF PRESENT ILLNESS
[FreeTextEntry1] : Patient is a 75-year-old female who is status post a colonoscopy in 2018 that revealed a sessile sigmoid polyp that was removed. This was adenomatous with intramucosal carcinoma. The carcinoma did not advance to 2 mm from the cautery. An attempt at colonoscopy in 2019 was incomplete due to redundant colon. Virtual colonoscopy was done that did not reveal residual polyp. CEA level was 2.1 in July, 2019.\par Patient has been complaining of left upper quadrant discomfort and nausea especially in AM.    She has had the symptoms for several weeks.  There has been no relief with Pepcid. She is on Omeprazole in AM.  She denies any weight loss.\par Patient has a history of cholelithiasis.\par .

## 2021-06-09 NOTE — REVIEW OF SYSTEMS
[Feeling Tired] : feeling tired [As Noted in HPI] : as noted in HPI [Depression] : depression [Negative] : Heme/Lymph

## 2021-06-09 NOTE — ASSESSMENT
[FreeTextEntry1] : Patient with dyspepsia and nausea in the morning.  She will take omeprazole at night and famotidine in the morning.\par She does have a history of carcinoma in situ of the colon.  CEA has been stable.\par Patient will be seen in 6 months.  A colonoscopy and possibly an upper endoscopy will be scheduled at that time.\par An upper abdominal ultrasound will be ordered to monitor her gallstones and the gallbladder wall.

## 2021-06-18 LAB — CEA SERPL-MCNC: 2.3 NG/ML

## 2021-08-03 ENCOUNTER — RX RENEWAL (OUTPATIENT)
Age: 76
End: 2021-08-03

## 2021-12-05 ENCOUNTER — RX RENEWAL (OUTPATIENT)
Age: 76
End: 2021-12-05

## 2021-12-22 ENCOUNTER — APPOINTMENT (OUTPATIENT)
Dept: GASTROENTEROLOGY | Facility: CLINIC | Age: 76
End: 2021-12-22
Payer: MEDICARE

## 2021-12-22 VITALS
TEMPERATURE: 98 F | HEART RATE: 71 BPM | WEIGHT: 117 LBS | HEIGHT: 66 IN | SYSTOLIC BLOOD PRESSURE: 160 MMHG | BODY MASS INDEX: 18.8 KG/M2 | DIASTOLIC BLOOD PRESSURE: 75 MMHG | OXYGEN SATURATION: 94 %

## 2021-12-22 DIAGNOSIS — Z01.818 ENCOUNTER FOR OTHER PREPROCEDURAL EXAMINATION: ICD-10-CM

## 2021-12-22 PROCEDURE — 99214 OFFICE O/P EST MOD 30 MIN: CPT

## 2021-12-22 RX ORDER — POLYETHYLENE GLYCOL 3350, SODIUM SULFATE, SODIUM CHLORIDE, POTASSIUM CHLORIDE, ASCORBIC ACID, SODIUM ASCORBATE 140-9-5.2G
140 KIT ORAL
Qty: 1 | Refills: 0 | Status: ACTIVE | COMMUNITY
Start: 2021-12-22 | End: 1900-01-01

## 2021-12-22 NOTE — ASSESSMENT
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  She will need another colonoscopy.  She has lost weight but this may be due to her dental work.  We will get blood work, CEA level, and a CAT scan of the abdomen and pelvis was ordered.  She has lost 20 pounds and we will continue to monitor her weight.\par She seems somewhat depressed as well.

## 2021-12-22 NOTE — HISTORY OF PRESENT ILLNESS
[FreeTextEntry1] : Patient is a 76-year-old female who is status post a colonoscopy in 2018 that revealed a sessile sigmoid polyp that was removed. This was adenomatous with intramucosal carcinoma. The carcinoma did not advance to 2 mm from the cautery. An attempt at colonoscopy in 2019 was incomplete due to redundant colon. Virtual colonoscopy was done that did not reveal residual polyp. CEA level was 2.1 in July, 2019.\par Her bowel movements are regular.  She denies seeing any blood or mucus in the stool.\par Patient also has no heartburn or nausea.  She continues to take omeprazole daily.  She does complain of some vague left upper quadrant discomfort not related to meals.  It is not progressive and not nocturnal.\par Patient has a history of cholelithiasis.  She has not had any episodes that can be considered as biliary colic.\par She is having some dental work and her appetite and intake have decreased.  She has lost about 20 pounds over 1 year.\par .

## 2021-12-22 NOTE — REVIEW OF SYSTEMS
[Feeling Tired] : feeling tired [Recent Weight Loss (___ Lbs)] : recent [unfilled] ~Ulb weight loss [As Noted in HPI] : as noted in HPI [Depression] : depression [Negative] : Heme/Lymph

## 2022-01-19 LAB
ALBUMIN SERPL ELPH-MCNC: 5 G/DL
ALP BLD-CCNC: 122 U/L
ALT SERPL-CCNC: 17 U/L
ANION GAP SERPL CALC-SCNC: 13 MMOL/L
APPEARANCE: CLEAR
AST SERPL-CCNC: 17 U/L
BACTERIA: NEGATIVE
BASOPHILS # BLD AUTO: 0.02 K/UL
BASOPHILS NFR BLD AUTO: 0.3 %
BILIRUB SERPL-MCNC: 0.3 MG/DL
BILIRUBIN URINE: NEGATIVE
BLOOD URINE: NEGATIVE
BUN SERPL-MCNC: 15 MG/DL
CALCIUM SERPL-MCNC: 10.3 MG/DL
CEA SERPL-MCNC: 1.7 NG/ML
CHLORIDE SERPL-SCNC: 101 MMOL/L
CHOLEST SERPL-MCNC: 246 MG/DL
CO2 SERPL-SCNC: 25 MMOL/L
COLOR: YELLOW
CREAT SERPL-MCNC: 0.84 MG/DL
EOSINOPHIL # BLD AUTO: 0.04 K/UL
EOSINOPHIL NFR BLD AUTO: 0.5 %
ERYTHROCYTE [SEDIMENTATION RATE] IN BLOOD BY WESTERGREN METHOD: 26 MM/HR
GLUCOSE QUALITATIVE U: NEGATIVE
GLUCOSE SERPL-MCNC: 100 MG/DL
HCT VFR BLD CALC: 37.6 %
HDLC SERPL-MCNC: 90 MG/DL
HGB BLD-MCNC: 11.5 G/DL
HYALINE CASTS: 3 /LPF
IMM GRANULOCYTES NFR BLD AUTO: 0.4 %
KETONES URINE: NEGATIVE
LDLC SERPL CALC-MCNC: 134 MG/DL
LEUKOCYTE ESTERASE URINE: NEGATIVE
LYMPHOCYTES # BLD AUTO: 2.09 K/UL
LYMPHOCYTES NFR BLD AUTO: 28.2 %
MAN DIFF?: NORMAL
MCHC RBC-ENTMCNC: 27.3 PG
MCHC RBC-ENTMCNC: 30.6 GM/DL
MCV RBC AUTO: 89.1 FL
MICROSCOPIC-UA: NORMAL
MONOCYTES # BLD AUTO: 0.35 K/UL
MONOCYTES NFR BLD AUTO: 4.7 %
NEUTROPHILS # BLD AUTO: 4.87 K/UL
NEUTROPHILS NFR BLD AUTO: 65.9 %
NITRITE URINE: NEGATIVE
NONHDLC SERPL-MCNC: 156 MG/DL
PH URINE: 6.5
PLATELET # BLD AUTO: 224 K/UL
POTASSIUM SERPL-SCNC: 4.7 MMOL/L
PROT SERPL-MCNC: 7.6 G/DL
PROTEIN URINE: NORMAL
RBC # BLD: 4.22 M/UL
RBC # FLD: 13.4 %
RED BLOOD CELLS URINE: 7 /HPF
SARS-COV-2 N GENE NPH QL NAA+PROBE: NOT DETECTED
SODIUM SERPL-SCNC: 140 MMOL/L
SPECIFIC GRAVITY URINE: 1.02
SQUAMOUS EPITHELIAL CELLS: 2 /HPF
TRIGL SERPL-MCNC: 110 MG/DL
TSH SERPL-ACNC: 1.66 UIU/ML
UROBILINOGEN URINE: NORMAL
WBC # FLD AUTO: 7.4 K/UL
WHITE BLOOD CELLS URINE: 2 /HPF

## 2022-01-20 ENCOUNTER — APPOINTMENT (OUTPATIENT)
Dept: GASTROENTEROLOGY | Facility: AMBULATORY MEDICAL SERVICES | Age: 77
End: 2022-01-20
Payer: MEDICARE

## 2022-01-20 PROCEDURE — 45378 DIAGNOSTIC COLONOSCOPY: CPT

## 2022-04-19 ENCOUNTER — RX RENEWAL (OUTPATIENT)
Age: 77
End: 2022-04-19

## 2022-07-13 ENCOUNTER — APPOINTMENT (OUTPATIENT)
Dept: GASTROENTEROLOGY | Facility: CLINIC | Age: 77
End: 2022-07-13

## 2022-07-13 VITALS
OXYGEN SATURATION: 95 % | WEIGHT: 114 LBS | HEART RATE: 73 BPM | DIASTOLIC BLOOD PRESSURE: 55 MMHG | BODY MASS INDEX: 18.32 KG/M2 | TEMPERATURE: 98.3 F | HEIGHT: 66 IN | SYSTOLIC BLOOD PRESSURE: 105 MMHG

## 2022-07-13 DIAGNOSIS — K63.5 POLYP OF COLON: ICD-10-CM

## 2022-07-13 DIAGNOSIS — K82.9 DISEASE OF GALLBLADDER, UNSPECIFIED: ICD-10-CM

## 2022-07-13 DIAGNOSIS — R10.9 UNSPECIFIED ABDOMINAL PAIN: ICD-10-CM

## 2022-07-13 DIAGNOSIS — R11.0 NAUSEA: ICD-10-CM

## 2022-07-13 PROCEDURE — 99214 OFFICE O/P EST MOD 30 MIN: CPT

## 2022-07-21 PROBLEM — K63.5 VILLOGLANDULAR COLONIC POLYP: Status: ACTIVE | Noted: 2018-05-30

## 2022-07-21 NOTE — ASSESSMENT
[FreeTextEntry1] : Patient with left upper quadrant pain and some sternal tightness and nausea.  The symptoms occurred 3 to 4 weeks ago and lasted 1 to 2 hours.  She does have a history of gallstones and this may be due to biliary colic.  An upper GI endoscopy will be scheduled.  A sonogram will be ordered.  A low-fat diet will be prescribed.  Patient will continue her current medical regimen.\par She will be referred to surgery for evaluation of possible cholecystectomy.

## 2022-07-21 NOTE — HISTORY OF PRESENT ILLNESS
[FreeTextEntry1] : Patient is a 76-year-old female who is status post a colonoscopy in 2018 that revealed a sessile sigmoid polyp that was removed. This was adenomatous with intramucosal carcinoma. The carcinoma did not advance to 2 mm from the cautery. An attempt at colonoscopy in 2019 was incomplete due to redundant colon. Virtual colonoscopy was done that did not reveal residual polyp. CEA level was 2.1 in July, 2019.\par Her bowel movements are regular.  She denies seeing any blood or mucus in the stool.\par Patient also has no heartburn or nausea.  She continues to take omeprazole daily.  She does complain of some vague left upper quadrant discomfort not related to meals.  It is not progressive and not nocturnal.\par Patient has a history of cholelithiasis.  She has not had any episodes that can be considered as biliary colic.\par She is having some dental work and her appetite and intake have decreased.  She has lost about 20 pounds over 1 year.\par \par Patient had recurrence of her symptoms of left upper quadrant pain, nausea, and sternal tightness which occurred 3 to 4 weeks ago.  The symptoms can last 1 to 2 hours.\par .

## 2022-08-23 ENCOUNTER — NON-APPOINTMENT (OUTPATIENT)
Age: 77
End: 2022-08-23

## 2022-08-24 LAB — SARS-COV-2 N GENE NPH QL NAA+PROBE: NOT DETECTED

## 2022-08-25 ENCOUNTER — APPOINTMENT (OUTPATIENT)
Dept: GASTROENTEROLOGY | Facility: AMBULATORY MEDICAL SERVICES | Age: 77
End: 2022-08-25

## 2022-08-25 PROCEDURE — 43239 EGD BIOPSY SINGLE/MULTIPLE: CPT

## 2022-09-19 ENCOUNTER — APPOINTMENT (OUTPATIENT)
Dept: GASTROENTEROLOGY | Facility: CLINIC | Age: 77
End: 2022-09-19

## 2022-09-19 DIAGNOSIS — R10.9 UNSPECIFIED ABDOMINAL PAIN: ICD-10-CM

## 2022-09-19 PROCEDURE — 99442: CPT | Mod: 95

## 2022-09-24 PROBLEM — R10.9 LEFT SIDED ABDOMINAL PAIN: Status: ACTIVE | Noted: 2018-07-23

## 2022-09-24 NOTE — HISTORY OF PRESENT ILLNESS
[Home] : at home, [unfilled] , at the time of the visit. [Medical Office: ([facility])___] : at the medical office located in  [Verbal consent obtained from patient] : the patient, [unfilled] [FreeTextEntry1] : Patient is a 76-year-old female who is status post a colonoscopy in 2018 that revealed a sessile sigmoid polyp that was removed. This was adenomatous with intramucosal carcinoma. The carcinoma did not advance to 2 mm from the cautery. An attempt at colonoscopy in 2019 was incomplete due to redundant colon. Virtual colonoscopy was done that did not reveal residual polyp. CEA level was 2.1 in July, 2019.  Patient had a colonoscopy in 1/2022.  No residual polyp was seen.  Patient did have some mild diverticulosis.\par Her bowel movements are regular.  She denies seeing any blood or mucus in the stool.\par Patient also has no heartburn or nausea.  She continues to take omeprazole daily.  She does complain of some vague left upper quadrant discomfort not related to meals.  It is not progressive and not nocturnal.\par Patient has a history of cholelithiasis.  She has not had any episodes that can be considered as biliary colic.\par She is having some dental work and her appetite and intake have decreased.  She has lost about 20 pounds over 1 year.\par \par Patient had recurrence of her symptoms of left upper quadrant pain, nausea, and sternal tightness which occurred 3 to 4 weeks ago.  The symptoms can last 1 to 2 hours.\par \par Patient had an upper endoscopy on 8/25/2022.  She did have a diverticulum in the mid esophagus.  This was likely due to esophageal spasm.  Biopsies of the esophagus revealed mild inflammation.  The rest of the upper endoscopy was normal.  She continues to take omeprazole and famotidine.\par \par She still complains of some left lower quadrant discomfort.  Her bowel movements are regular.\par .

## 2022-09-24 NOTE — ASSESSMENT
[FreeTextEntry1] : Patient with history of carcinoma in situ and a polyp.  A follow-up colonoscopy in January did not reveal any residual polyp.\par Patient also had an upper endoscopy that revealed an esophageal diverticulum.  She likely has esophageal spasm.  She will continue omeprazole and famotidine.\par Patient has asymptomatic gallstones.\par \par Time spent in counseling 15 min.

## 2022-10-12 ENCOUNTER — RX RENEWAL (OUTPATIENT)
Age: 77
End: 2022-10-12

## 2022-11-18 ENCOUNTER — RX RENEWAL (OUTPATIENT)
Age: 77
End: 2022-11-18

## 2022-11-18 RX ORDER — OMEPRAZOLE 40 MG/1
40 CAPSULE, DELAYED RELEASE ORAL
Qty: 30 | Refills: 3 | Status: ACTIVE | COMMUNITY
Start: 2018-11-26 | End: 1900-01-01

## 2023-02-02 ENCOUNTER — APPOINTMENT (OUTPATIENT)
Dept: RHEUMATOLOGY | Facility: CLINIC | Age: 78
End: 2023-02-02
Payer: MEDICARE

## 2023-02-02 ENCOUNTER — LABORATORY RESULT (OUTPATIENT)
Age: 78
End: 2023-02-02

## 2023-02-02 VITALS
TEMPERATURE: 97.3 F | DIASTOLIC BLOOD PRESSURE: 68 MMHG | SYSTOLIC BLOOD PRESSURE: 158 MMHG | BODY MASS INDEX: 18.32 KG/M2 | WEIGHT: 114 LBS | HEART RATE: 75 BPM | OXYGEN SATURATION: 97 % | HEIGHT: 66 IN

## 2023-02-02 PROCEDURE — 99205 OFFICE O/P NEW HI 60 MIN: CPT

## 2023-02-06 LAB
24R-OH-CALCIDIOL SERPL-MCNC: 54.8 PG/ML
25(OH)D3 SERPL-MCNC: 55.8 NG/ML
ACE BLD-CCNC: 38 U/L
ALBUMIN SERPL ELPH-MCNC: 5 G/DL
ALP BLD-CCNC: 102 U/L
ALT SERPL-CCNC: 14 U/L
ANION GAP SERPL CALC-SCNC: 14 MMOL/L
APPEARANCE: CLEAR
AST SERPL-CCNC: 12 U/L
BACTERIA: NEGATIVE
BASOPHILS # BLD AUTO: 0.04 K/UL
BASOPHILS NFR BLD AUTO: 0.6 %
BILIRUB SERPL-MCNC: 0.2 MG/DL
BILIRUBIN URINE: NEGATIVE
BLOOD URINE: NEGATIVE
BUN SERPL-MCNC: 26 MG/DL
C3 SERPL-MCNC: 91 MG/DL
C4 SERPL-MCNC: 26 MG/DL
CALCIUM SERPL-MCNC: 9.9 MG/DL
CENTROMERE IGG SER-ACNC: <0.2 CD:130001892
CHLORIDE SERPL-SCNC: 102 MMOL/L
CO2 SERPL-SCNC: 26 MMOL/L
COLOR: YELLOW
CREAT SERPL-MCNC: 0.96 MG/DL
CREAT SPEC-SCNC: 311 MG/DL
CREAT/PROT UR: 0.1 RATIO
DSDNA AB SER-ACNC: <12 IU/ML
EGFR: 61 ML/MIN/1.73M2
ENA RNP AB SER IA-ACNC: 0.2 AL
ENA SCL70 IGG SER IA-ACNC: <0.2 AL
ENA SM AB SER IA-ACNC: <0.2 AL
ENA SS-A AB SER IA-ACNC: <0.2 AL
ENA SS-B AB SER IA-ACNC: <0.2 AL
EOSINOPHIL # BLD AUTO: 0.16 K/UL
EOSINOPHIL NFR BLD AUTO: 2.3 %
GLUCOSE QUALITATIVE U: NEGATIVE
GLUCOSE SERPL-MCNC: 92 MG/DL
HCT VFR BLD CALC: 36.1 %
HGB BLD-MCNC: 11.3 G/DL
HYALINE CASTS: 0 /LPF
IMM GRANULOCYTES NFR BLD AUTO: 0.3 %
KETONES URINE: NEGATIVE
LEUKOCYTE ESTERASE URINE: ABNORMAL
LYMPHOCYTES # BLD AUTO: 2.54 K/UL
LYMPHOCYTES NFR BLD AUTO: 37.3 %
MAN DIFF?: NORMAL
MCHC RBC-ENTMCNC: 27.8 PG
MCHC RBC-ENTMCNC: 31.3 GM/DL
MCV RBC AUTO: 88.7 FL
MICROSCOPIC-UA: NORMAL
MONOCYTES # BLD AUTO: 0.57 K/UL
MONOCYTES NFR BLD AUTO: 8.4 %
NEUTROPHILS # BLD AUTO: 3.48 K/UL
NEUTROPHILS NFR BLD AUTO: 51.1 %
NITRITE URINE: NEGATIVE
PH URINE: 6
PLATELET # BLD AUTO: 290 K/UL
POTASSIUM SERPL-SCNC: 4.6 MMOL/L
PROT SERPL-MCNC: 7.6 G/DL
PROT UR-MCNC: 39 MG/DL
PROTEIN URINE: ABNORMAL
RBC # BLD: 4.07 M/UL
RBC # FLD: 14 %
RED BLOOD CELLS URINE: 3 /HPF
SODIUM SERPL-SCNC: 142 MMOL/L
SPECIFIC GRAVITY URINE: 1.04
SQUAMOUS EPITHELIAL CELLS: 12 /HPF
THYROGLOB AB SERPL-ACNC: <20 IU/ML
THYROPEROXIDASE AB SERPL IA-ACNC: 18.5 IU/ML
UROBILINOGEN URINE: ABNORMAL
WBC # FLD AUTO: 6.81 K/UL
WHITE BLOOD CELLS URINE: 9 /HPF

## 2023-02-08 LAB
ANA PAT FLD IF-IMP: ABNORMAL
ANA SER IF-ACNC: ABNORMAL

## 2023-02-28 NOTE — HISTORY OF PRESENT ILLNESS
[FreeTextEntry1] : Pt is being referred by nephrologist Dr. 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 No respiratory complaints. No ocular symptoms, joint pain, cardiomyopathy, rash.\par Pt has a h/o MS and has symptoms of abnormal temperature sensation of right arm and right foot.\par referred to nephrology for hydronephrosis. On renal workup in 12/2022 found to have pos JESIKA 1:2560, dsDNA, ANCA, MPO, PR3, neg (labs reviewed and scanned).\par \par No acute complaints.\par ROS\par no rash, photosensitivity, hair loss, oral or nasal ulcers, dry eyes or mouth, joint pain or swelling, fatigue, muscle weakness, fevers, chills, weight loss, Raynauds, numbness, tingling respiratory or gastrointestinal complaints\par \par \par PMH\par Multiple sclerosis\par trigeminal neuralgia\par colon ca in situ\par \par FH: No personal or family history of autoimmune disease.\par

## 2023-02-28 NOTE — PHYSICAL EXAM
[General Appearance - Alert] : alert [General Appearance - In No Acute Distress] : in no acute distress [General Appearance - Well Nourished] : well nourished [Sclera] : the sclera and conjunctiva were normal [Nasal Cavity] : the nasal mucosa and septum were normal [Neck Cervical Mass (___cm)] : no neck mass was observed [Auscultation Breath Sounds / Voice Sounds] : lungs were clear to auscultation bilaterally [Cervical Lymph Nodes Enlarged Posterior Bilaterally] : posterior cervical [Cervical Lymph Nodes Enlarged Anterior Bilaterally] : anterior cervical [Supraclavicular Lymph Nodes Enlarged Bilaterally] : supraclavicular [Musculoskeletal - Swelling] : no joint swelling seen [] : no rash [Sensation] : the sensory exam was normal to light touch and pinprick [Motor Exam] : the motor exam was normal [Oriented To Time, Place, And Person] : oriented to person, place, and time

## 2023-02-28 NOTE — ASSESSMENT
[FreeTextEntry1] : 77F reported h/o pulmonary sarcoidosis never treated coming with CKD found to have hydronephrosis and positive JESIKA 1: 1250.\par No clinical features of systemic autoimmune disease such as SLE or other. Positive JESIKA most likely in the setting of multiple sclerosis history.\par No clinical features of sarcoidosis\par \par Plan:\par check all subserologies for positive JESIKA\par given h/o sarcoidosis recommend to get CXR\par rtc as needed

## 2023-02-28 NOTE — CONSULT LETTER
[Dear  ___] : Dear  [unfilled], [Consult Letter:] : I had the pleasure of evaluating your patient, [unfilled]. [Please see my note below.] : Please see my note below. [Consult Closing:] : Thank you very much for allowing me to participate in the care of this patient.  If you have any questions, please do not hesitate to contact me. [Sincerely,] : Sincerely, [FreeTextEntry2] : Dr. Jose M Gayle\par [id number] [place] [address] [FreeTextEntry3] : Dr. Edith Mckeon\par

## 2023-04-03 ENCOUNTER — RX RENEWAL (OUTPATIENT)
Age: 78
End: 2023-04-03

## 2023-04-03 RX ORDER — SODIUM SULFATE, POTASSIUM SULFATE AND MAGNESIUM SULFATE 1.6; 3.13; 17.5 G/177ML; G/177ML; G/177ML
17.5-3.13-1.6 SOLUTION ORAL
Qty: 354 | Refills: 0 | Status: ACTIVE | COMMUNITY
Start: 2018-04-23 | End: 1900-01-01

## 2023-08-31 NOTE — REASON FOR VISIT
Surgery Consultation H&P    Patient: Marshall Cortez   MRN: [id number]   : 1948       History Of Present Illness  Lavinia Gonsalez is a 69year old female with a pmhx of PAD s/p femoral distal bypass w/ cadaver vein and s/p L BKA, wound infection at groin s/p wound vac, ESRD on HD, CAD,HTN, HLD, substance abuse, hypothyrodisim, Afib on Eliquis, PPM, DKA, DM presents to the ED after cardiac arrest outside the hospital. Per EMS she was found to have a PEA and got to [address] after 4 rounds. Per patients son the patient had missed her dialysis for the 3 preceding weeks. Vascular Surgery was consulted due to concern of sepsis secondary to abscess adjacent to left leg graft. US soft tissue shows 3.0 x 1.1 x 1.8 cm circumscribed anechoic lesion lateral to the graft  in the proximal thigh without internal color Doppler flow signal. Findings nonspecific and may represent abscess or seroma. Thrombosed left femoral graft with intraluminal echogenic material and lack of color Doppler and spectral Doppler flow signal.      ROS  Patient is intubated and comatose and unable to acquire a review of systems     Past Medical History   has a past medical history of CKD (chronic kidney disease) stage 5, GFR less than 15 ml/min (CMD) (2021), Diabetes mellitus (CMD), Essential (primary) hypertension, High cholesterol, Hypothyroidism, PAD (peripheral artery disease) (CMD), and Thyroid disease. Surgical History   has a past surgical history that includes thyroidectomy (N/A, 2019); Coronary artery bypass graft (2015); bypass graft othr,fem-pop; Other surgical history (Right, ); Pacemaker (Left); bypass graft othr,fem-tibial (Left, 2021); and Muscle flap (Left, 02/10/2021). Social History   reports that she has been smoking cigarettes. She started smoking about 54 years ago. She has a 27.3 pack-year smoking history. She has never used smokeless tobacco. She reports that she does not currently use alcohol.  She reports that she does not currently use drugs after having used the following drugs: Marijuana. Allergies  ALLERGIES:  Shellfish allergy   (food or med), Shellfish-derived products   (food or med), Hydrochlorothiazide, and Lisinopril    Medications  Medications Prior to Admission   Medication Sig Dispense Refill   â¢ cloNIDine (CATAPRES-TTS 2) 0.2 MG/24HR Place 1 patch onto the skin 1 day a week. Do not start before August 17, 2023. Box includes patch and non-medicated adhesive cover. Apply adhesive cover if patch begins to lift. 4 patch 0   â¢ epoetin hector-epbx (RETACRIT) 4000 UNIT/ML injection Inject 1 mL into the vein 3 days a week. 16.5 mL    â¢ nitroGLYCERIN topical (NITRO-BID) 2 % ointment Apply 0.5 inches topically every 12 hours. 30 g 0   â¢ NovoLOG FlexPen 100 UNIT/ML pen-injector INJECT 2 UNITS 3 TIMES A DAY BEFORE MEALS     â¢ atorvastatin (LIPITOR) 80 MG tablet Take 80 mg by mouth daily. â¢ calcium acetate gelcap (PHOSLO) 667 MG capsule Take 667 mg by mouth daily. â¢ Insulin Degludec Genetta Dick) 100 UNIT/ML Solution Inject 8 Units into the skin daily. â¢ levothyroxine 100 MCG tablet Take 100 mcg by mouth daily. â¢ aspirin (ECOTRIN) 81 MG EC tablet Take 81 mg by mouth daily. â¢ ergocalciferol (DRISDOL) 1.25 mg (50,000 units) capsule Take 1.25 mg by mouth 1 day a week. â¢ ferrous sulfate 325 (65 FE) MG tablet Take 325 mg by mouth in the morning and 325 mg in the evening. â¢ carvedilol (COREG) 12.5 MG tablet TAKE 1 TABLET BY MOUTH TWICE A DAY WITH MEALS (Patient taking differently: Take 12.5 mg by mouth in the morning and 12.5 mg in the evening. Take with meals. ) 180 tablet 1   â¢ BD Pen Needle Haley 2nd Gen 32G X 4 MM Misc USE AS DIRECTED WITH INSULIN PEN DAILY 100 each 11   â¢ albuterol (VENTOLIN) (2.5 MG/3ML) 0.083% nebulizer solution Take 3 mLs by nebulization every 6 hours as needed for Wheezing.  375 mL 3   â¢ OneTouch Ultra test strip      â¢ apixaBAN (Eliquis) 2.5 MG Tab Take 1 tablet by "mouth 2 times daily. 60 tablet 2   â¢ albuterol 108 (90 Base) MCG/ACT inhaler Inhale 2 puffs into the lungs every 4 hours as needed for Shortness of Breath or Wheezing. 1 each 12   â¢ Blood Glucose Monitoring Suppl (BLOOD GLUCOSE MONITOR SYSTEM) w/Device Kit PLEASE SUBSTITUTE COVERED BRAND GLUCOMETER KIT; For glucose testing once daily     â¢ Lancets Misc For glucose testing once daily                Last Recorded Vitals    VITAL SIGNS:     Vital Last Value 24 Hour Range   Temperature 97.3 Â°F (36.3 Â°C) (08/31/23 1330) Temp  Min: 97.2 Â°F (36.2 Â°C)  Max: 99.1 Â°F (37.3 Â°C)   Pulse 67 (08/31/23 1330) Pulse  Min: 60  Max: 80   Respiratory 20 (08/31/23 1130) Resp  Min: 10  Max: 21   Non-Invasive  Blood Pressure (!) 158/60 (08/30/23 0400) No data recorded   Pulse Oximetry 99 % (08/31/23 1330) SpO2  Min: 99 %  Max: 100 %     Vital Today Admitted   Weight 63.5 kg (139 lb 15.9 oz) (08/29/23 0909) Weight: 63.5 kg (139 lb 15.9 oz) (08/29/23 0909)   Height N/A Height: 5' 2.21"" (158 cm) (08/29/23 1917)   Body Mass Index N/A              Physical Exam  General: NAD  CV: Requiring Levophed  Pulm: Bilateral chest rise. Intubated   Abdomen: Soft, non-distended, no guarding, no rebound  Neuro: Intubated, no sedation. Extremities: Right foot is cold, capillary refill 3-4s. Left BKA, left groin with a punctuate wound oozing serous liquid, without pus. Non fluctuating, not erythematous.        LABORATORY DATA:    Lab Results   Component Value Date    SODIUM 137 08/31/2023    POTASSIUM 3.4 08/31/2023    CHLORIDE 97 08/31/2023    CO2 29 08/31/2023    BUN 17 08/31/2023    CREATININE 6.15 (H) 08/31/2023    GLUCOSE 157 (H) 08/31/2023     Lab Results   Component Value Date    WBC 12.8 (H) 08/31/2023    HCT 24.9 (L) 08/31/2023    HGB 8.4 (L) 08/31/2023    PLT 92 (L) 08/31/2023     Lab Results   Component Value Date    COL Yellow 02/17/2021    UAPP Clear 02/17/2021    USPG 1.014 02/17/2021    UPH 7.0 02/17/2021    UPROT 100 (A) 02/17/2021    UGLU " Negative 02/17/2021    UKET Negative 02/17/2021    UBILI Negative 02/17/2021    URBC Negative 02/17/2021    UNITR Negative 02/17/2021    UROB 2.0 (A) 02/17/2021    UWBC Negative 02/17/2021     Lab Results   Component Value Date    AST 2,689 ([facility]) 08/31/2023    GPT 1,260 (H) 08/31/2023    ALKPT 197 (H) 08/31/2023    BILIRUBIN 2.5 (H) 08/31/2023        IMAGING STUDIES:    US SOFT TISSUE GROIN LEFT   Final Result   FINDINGS/IMPRESSION:    *   3.0 x 1.1 x 1.8 cm circumscribed anechoic lesion lateral to the graft   in the proximal thigh without internal color Doppler flow signal. Findings   nonspecific and may represent abscess or seroma. Clinical correlation   recommended. *   Cobblestone thickened edematous subcutaneous tissue of the left   groin/proximal thigh   *   Thrombosed left femoral graft with intraluminal echogenic material and   lack of color Doppler and spectral Doppler flow signal.      Dictated by: Abdirizak Katz MD   Dictated on: 8/30/2023 10:16 PM          Ginny MORTON M.D., have reviewed the images and report and concur with   these findings interpreted by Abdirizak Katz MD.      Electronically Signed by: Ginny Reddy M.D. Signed on: 8/30/2023 10:37 PM    Workstation ID: [id number]      XR CHEST AP OR PA   Final Result   FINDINGS AND IMPRESSION: Endotracheal tube tip projects below the thoracic   inlet and above the keturah. NG tube passes below the diaphragm with tip not   included. Right neck hemodialysis catheter tip projects over the right   atrium. Left-sided ICD with leads projected over the right atrium and right   ventricle. Sternotomy wires. Low lung volumes with stable cardiomediastinal silhouette. Interval   increased patchy midlung and bibasilar opacities which may represent   worsening atelectasis, pulmonary edema/volume overload or pneumonitis. No   large pleural effusions or pneumothorax.       Electronically Signed by: Ana Hernandez MD    Signed on: 8/30/2023 8:49 AM Workstation ID: [id number]      XR CHEST AP OR PA   Final Result   Endotracheal tube above the keturah and OG tube extending below the   diaphragm, off the field-of-view. Electronically Signed by: Mary Hall MD    Signed on: 8/29/2023 2:39 PM    Workstation ID: [id number]      CT HEAD WO CONTRAST   Final Result   No acute intracranial process. Electronically Signed by: Mary Hall MD    Signed on: 8/29/2023 9:23 AM    Workstation ID: [id number]      CT CHEST ABDOMEN PELVIS WO CONTRAST   Final Result      New patchy consolidations in both lungs, with associated endobronchial   debris, concerning for aspiration pneumonia with other infectious or   inflammatory pneumonia not excluded. Cardiomegaly, mosaic attenuation of the lungs, trace right pleural   effusion, massive ascites, and body wall anasarca are compatible with   congestive heart failure and volume overload or third spacing. Endotracheal tube tip is 1.5 cm above the keturah. Recommend slight   retraction. Redemonstrated right adnexal cystic lesion. Recommend nonemergent pelvic   ultrasound. Dictated by: Joshua Mariscal M.D. Dictated on: 8/29/2023 9:20 AM          IDanny MD, have reviewed the images and report and concur   with these findings interpreted by Joshua Mariscal M.D..      Electronically Signed by: Danny Bundy MD    Signed on: 8/29/2023 10:09 AM    Workstation ID: [id number]      XR CHEST AP OR PA   Final Result   DESCRIPTION/IMPRESSION:   Findings compatible for congestive heart failure/volume overload with   cephalization, interstitial opacities, and groundglass airspace edema. Cardiomegaly is unchanged. Prior sternotomy. Atherosclerotic aorta. Right   atrial pacer and right ventricular defibrillator leads. Trace pleural fluid in the right costophrenic angle. No left effusion   evident. No pneumothorax. ET tube tip is 2.4 cm above the keturah. Transesophageal tube in the distal   stomach. Tunneled dialysis line tip in the distal SVC. Electronically Signed by: Dwight Gillespie M.D. Signed on: 8/29/2023 8:28 AM    Workstation ID: [id number]            Assessment/Plan:  Vascular Surgery was consulted due to concern of sepsis secondary to abscess adjacent to left leg graft. US soft tissue shows 3.0 x 1.1 x 1.8 cm circumscribed anechoic lesion lateral to the graft  in the proximal thigh without internal color Doppler flow signal. Findings nonspecific and may represent abscess or seroma. Thrombosed left femoral graft with intraluminal echogenic material and lack of color Doppler and spectral Doppler flow signal.    Plan  - Incision and drainage at bedside. It doesn't seem as the infectious source for sepsis.  Consent signed by family.   - Rest per primary     Discussed with Dr John Hicks [Follow-Up: _____] : a [unfilled] follow-up visit

## 2023-11-09 ENCOUNTER — APPOINTMENT (OUTPATIENT)
Dept: GASTROENTEROLOGY | Facility: CLINIC | Age: 78
End: 2023-11-09

## 2024-02-01 ENCOUNTER — RX RENEWAL (OUTPATIENT)
Age: 79
End: 2024-02-01

## 2024-02-01 RX ORDER — FAMOTIDINE 20 MG/1
20 TABLET, FILM COATED ORAL
Qty: 30 | Refills: 5 | Status: ACTIVE | COMMUNITY
Start: 2021-06-09 | End: 1900-01-01

## 2024-05-29 ENCOUNTER — APPOINTMENT (OUTPATIENT)
Dept: GASTROENTEROLOGY | Facility: CLINIC | Age: 79
End: 2024-05-29
Payer: MEDICARE

## 2024-05-29 VITALS
WEIGHT: 112 LBS | BODY MASS INDEX: 18 KG/M2 | RESPIRATION RATE: 16 BRPM | DIASTOLIC BLOOD PRESSURE: 65 MMHG | OXYGEN SATURATION: 95 % | HEIGHT: 66 IN | TEMPERATURE: 98.2 F | SYSTOLIC BLOOD PRESSURE: 118 MMHG | HEART RATE: 73 BPM

## 2024-05-29 DIAGNOSIS — Q39.6 CONGENITAL DIVERTICULUM OF ESOPHAGUS: ICD-10-CM

## 2024-05-29 DIAGNOSIS — R63.4 ABNORMAL WEIGHT LOSS: ICD-10-CM

## 2024-05-29 DIAGNOSIS — R10.12 LEFT UPPER QUADRANT PAIN: ICD-10-CM

## 2024-05-29 DIAGNOSIS — K22.4 DYSKINESIA OF ESOPHAGUS: ICD-10-CM

## 2024-05-29 DIAGNOSIS — K80.20 CALCULUS OF GALLBLADDER W/OUT CHOLECYSTITIS W/OUT OBSTRUCTION: ICD-10-CM

## 2024-05-29 DIAGNOSIS — K59.00 CONSTIPATION, UNSPECIFIED: ICD-10-CM

## 2024-05-29 DIAGNOSIS — D01.0 CARCINOMA IN SITU OF COLON: ICD-10-CM

## 2024-05-29 DIAGNOSIS — R63.0 ANOREXIA: ICD-10-CM

## 2024-05-29 DIAGNOSIS — R76.8 OTHER SPECIFIED ABNORMAL IMMUNOLOGICAL FINDINGS IN SERUM: ICD-10-CM

## 2024-05-29 DIAGNOSIS — G35 MULTIPLE SCLEROSIS: ICD-10-CM

## 2024-05-29 DIAGNOSIS — G50.0 TRIGEMINAL NEURALGIA: ICD-10-CM

## 2024-05-29 DIAGNOSIS — D86.9 SARCOIDOSIS, UNSPECIFIED: ICD-10-CM

## 2024-05-29 PROCEDURE — 99214 OFFICE O/P EST MOD 30 MIN: CPT

## 2024-05-29 RX ORDER — LACTULOSE 10 G/15ML
10 SOLUTION ORAL
Qty: 1 | Refills: 2 | Status: ACTIVE | COMMUNITY
Start: 2024-05-29 | End: 1900-01-01

## 2024-05-29 NOTE — HISTORY OF PRESENT ILLNESS
[FreeTextEntry1] : Patient is a 78-year-old female who is status post a colonoscopy in 2018 that revealed a sessile sigmoid polyp that was removed. This was adenomatous with intramucosal carcinoma. The carcinoma did not advance to 2 mm from the cautery. An attempt at colonoscopy in 2019 was incomplete due to redundant colon. Virtual colonoscopy was done that did not reveal residual polyp. CEA level was 2.1 in July, 2019.  Patient had a colonoscopy in 1/2022.  No residual polyp was seen.  Patient did have some mild diverticulosis. Her bowel movements are regular.  She denies seeing any blood or mucus in the stool. Patient also has no heartburn or nausea.  She continues to take omeprazole daily.  She does complain of some vague left upper quadrant discomfort not related to meals.  It is not progressive and not nocturnal. Patient has a history of cholelithiasis.  She has not had any episodes that can be considered as biliary colic. She is having some dental work and her appetite and intake have decreased.  She has lost about 20 pounds over 1 year.  Patient had recurrence of her symptoms of left upper quadrant pain, nausea, and sternal tightness which occurred 3 to 4 weeks ago.  The symptoms can last 1 to 2 hours.  Patient had an upper endoscopy on 8/25/2022.  She did have a diverticulum in the mid esophagus.  This was likely due to esophageal spasm.  Biopsies of the esophagus revealed mild inflammation.  The rest of the upper endoscopy was normal.  She continues to take omeprazole and famotidine.  She still complains of some left lower quadrant discomfort.  Her bowel movements are regular.  5/29/2024-patient has been complaining of left upper quadrant pain for the past 2 to 3 months.  She describes it as an ache and sometimes a stabbing.  It is intermittent but occurs daily.  It does not awaken her from sleep in the middle of the night.  It is positional and sometimes increases with movement.  The pain is not related to meals.  She has lost about 8 to 10 pounds.  She has had a decreased appetite and is undergoing significant dental work.  She is also had an exacerbation of her trigeminal neuralgia.  This has been ongoing for the past 3 to 4 months. She also complains of increasing constipation.  She does not move her bowels for 3 days. .

## 2024-05-29 NOTE — ASSESSMENT
[FreeTextEntry1] : Patient with left upper quadrant pain for the past 3 to 4 months.  The pain may be musculoskeletal.  But she does have some weight loss.  She is also having dental work and has a decreased appetite which may be contributing to the weight loss.  She is having an exacerbation of her trigeminal neuralgia which makes eating more difficult. She does complain of recent constipation which may be due to her change in diet.  She will be given lactulose 1 to 2 tablespoons once to twice a day.  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  CEA level will be ordered.  Her last colonoscopy was 2022. Patient will likely need a CT scan of the abdomen and pelvis and possibly chest.  Blood work will be ordered first. She does have a history of multiple sclerosis and sarcoidosis.

## 2024-05-29 NOTE — PHYSICAL EXAM
[Alert] : alert [Normal Voice/Communication] : normal voice/communication [Healthy Appearing] : healthy appearing [No Acute Distress] : no acute distress [Sclera] : the sclera and conjunctiva were normal [Hearing Threshold Finger Rub Not [place]] : hearing was normal [Normal Lips/Gums] : the lips and gums were normal [Oropharynx] : the oropharynx was normal [Normal Appearance] : the appearance of the neck was normal [No Neck Mass] : no neck mass was observed [No Respiratory Distress] : no respiratory distress [No Acc Muscle Use] : no accessory muscle use [Respiration, Rhythm And Depth] : normal respiratory rhythm and effort [Heart Rate And Rhythm] : heart rate was normal and rhythm regular [Normal S1, S2] : normal S1 and S2 [Murmurs] : no murmurs [Bowel Sounds] : normal bowel sounds [Abdomen Tenderness] : non-tender [No Masses] : no abdominal mass palpated [Abdomen Soft] : soft [] : no hepatosplenomegaly [Oriented To Time, Place, And Person] : oriented to person, place, and time [de-identified] : Temporal wasting, chronically ill appearing [de-identified] : Decreased BS on L

## 2024-05-29 NOTE — REVIEW OF SYSTEMS
[Feeling Poorly] : feeling poorly [Feeling Tired] : feeling tired [Recent Weight Loss (___ Lbs)] : recent [unfilled] ~Ulb weight loss [As Noted in HPI] : as noted in HPI [Joint Stiffness] : joint stiffness [Depression] : depression [Negative] : Heme/Lymph [de-identified] : Hx of MS

## 2024-06-07 LAB
HCT VFR BLD CALC: 36.8 %
HGB BLD-MCNC: 11.2 G/DL
MCHC RBC-ENTMCNC: 28.2 PG
MCHC RBC-ENTMCNC: 30.4 GM/DL
MCV RBC AUTO: 92.7 FL
PLATELET # BLD AUTO: 265 K/UL
RBC # BLD: 3.97 M/UL
RBC # FLD: 13.3 %
WBC # FLD AUTO: 4.92 K/UL

## 2024-06-08 LAB
ALBUMIN SERPL ELPH-MCNC: 5 G/DL
ALP BLD-CCNC: 103 U/L
ALT SERPL-CCNC: 9 U/L
ANION GAP SERPL CALC-SCNC: 14 MMOL/L
APPEARANCE: CLEAR
AST SERPL-CCNC: 15 U/L
BACTERIA: ABNORMAL /HPF
BILIRUB SERPL-MCNC: 0.2 MG/DL
BILIRUBIN URINE: NEGATIVE
BLOOD URINE: NEGATIVE
BUN SERPL-MCNC: 14 MG/DL
CALCIUM SERPL-MCNC: 10 MG/DL
CAST: 20 /LPF
CEA SERPL-MCNC: 2.3 NG/ML
CHLORIDE SERPL-SCNC: 102 MMOL/L
CO2 SERPL-SCNC: 25 MMOL/L
COLOR: YELLOW
CREAT SERPL-MCNC: 1.28 MG/DL
CRP SERPL-MCNC: <3 MG/L
EGFR: 43 ML/MIN/1.73M2
EPITHELIAL CELLS: 35 /HPF
GLUCOSE QUALITATIVE U: NEGATIVE MG/DL
GLUCOSE SERPL-MCNC: 84 MG/DL
HYALINE CASTS: PRESENT
KETONES URINE: NEGATIVE MG/DL
LEUKOCYTE ESTERASE URINE: ABNORMAL
MICROSCOPIC-UA: NORMAL
NITRITE URINE: NEGATIVE
PH URINE: 6
POTASSIUM SERPL-SCNC: 4 MMOL/L
PROT SERPL-MCNC: 7.5 G/DL
PROTEIN URINE: NORMAL MG/DL
RED BLOOD CELLS URINE: 3 /HPF
REVIEW: NORMAL
SODIUM SERPL-SCNC: 140 MMOL/L
SPECIFIC GRAVITY URINE: 1.02
TSH SERPL-ACNC: 1.82 UIU/ML
UROBILINOGEN URINE: 0.2 MG/DL
WHITE BLOOD CELLS URINE: 6 /HPF

## 2024-07-18 ENCOUNTER — APPOINTMENT (OUTPATIENT)
Dept: CT IMAGING | Facility: CLINIC | Age: 79
End: 2024-07-18

## 2024-07-26 ENCOUNTER — APPOINTMENT (OUTPATIENT)
Dept: CT IMAGING | Facility: CLINIC | Age: 79
End: 2024-07-26
Payer: MEDICARE

## 2024-07-26 PROCEDURE — 74177 CT ABD & PELVIS W/CONTRAST: CPT | Mod: 26,MH

## 2024-11-13 ENCOUNTER — APPOINTMENT (OUTPATIENT)
Dept: GASTROENTEROLOGY | Facility: CLINIC | Age: 79
End: 2024-11-13
Payer: MEDICARE

## 2024-11-13 VITALS
OXYGEN SATURATION: 95 % | SYSTOLIC BLOOD PRESSURE: 107 MMHG | RESPIRATION RATE: 16 BRPM | HEART RATE: 66 BPM | WEIGHT: 106 LBS | DIASTOLIC BLOOD PRESSURE: 60 MMHG | BODY MASS INDEX: 17.04 KG/M2 | TEMPERATURE: 98.2 F | HEIGHT: 66 IN

## 2024-11-13 DIAGNOSIS — R10.12 LEFT UPPER QUADRANT PAIN: ICD-10-CM

## 2024-11-13 DIAGNOSIS — R93.5 ABNORMAL FINDINGS ON DIAGNOSTIC IMAGING OF OTHER ABDOMINAL REGIONS, INCLUDING RETROPERITONEUM: ICD-10-CM

## 2024-11-13 DIAGNOSIS — G35 MULTIPLE SCLEROSIS: ICD-10-CM

## 2024-11-13 DIAGNOSIS — R63.0 ANOREXIA: ICD-10-CM

## 2024-11-13 DIAGNOSIS — R63.4 ABNORMAL WEIGHT LOSS: ICD-10-CM

## 2024-11-13 PROCEDURE — 99214 OFFICE O/P EST MOD 30 MIN: CPT

## 2024-11-13 RX ORDER — ESCITALOPRAM OXALATE 5 MG/1
5 TABLET ORAL
Qty: 30 | Refills: 1 | Status: ACTIVE | COMMUNITY
Start: 2024-11-13 | End: 1900-01-01

## 2024-12-04 ENCOUNTER — APPOINTMENT (OUTPATIENT)
Dept: MRI IMAGING | Facility: IMAGING CENTER | Age: 79
End: 2024-12-04

## 2024-12-04 ENCOUNTER — OUTPATIENT (OUTPATIENT)
Dept: OUTPATIENT SERVICES | Facility: HOSPITAL | Age: 79
LOS: 1 days | End: 2024-12-04

## 2024-12-04 DIAGNOSIS — R93.5 ABNORMAL FINDINGS ON DIAGNOSTIC IMAGING OF OTHER ABDOMINAL REGIONS, INCLUDING RETROPERITONEUM: ICD-10-CM

## 2025-01-13 ENCOUNTER — APPOINTMENT (OUTPATIENT)
Dept: GASTROENTEROLOGY | Facility: CLINIC | Age: 80
End: 2025-01-13
Payer: MEDICARE

## 2025-01-13 VITALS
SYSTOLIC BLOOD PRESSURE: 120 MMHG | OXYGEN SATURATION: 96 % | HEIGHT: 66 IN | HEART RATE: 56 BPM | RESPIRATION RATE: 16 BRPM | BODY MASS INDEX: 16.23 KG/M2 | DIASTOLIC BLOOD PRESSURE: 64 MMHG | TEMPERATURE: 98.2 F | WEIGHT: 101 LBS

## 2025-01-13 DIAGNOSIS — K80.20 CALCULUS OF GALLBLADDER W/OUT CHOLECYSTITIS W/OUT OBSTRUCTION: ICD-10-CM

## 2025-01-13 DIAGNOSIS — D01.0 CARCINOMA IN SITU OF COLON: ICD-10-CM

## 2025-01-13 DIAGNOSIS — R63.0 ANOREXIA: ICD-10-CM

## 2025-01-13 DIAGNOSIS — R63.4 ABNORMAL WEIGHT LOSS: ICD-10-CM

## 2025-01-13 DIAGNOSIS — G35 MULTIPLE SCLEROSIS: ICD-10-CM

## 2025-01-13 DIAGNOSIS — Q39.6 CONGENITAL DIVERTICULUM OF ESOPHAGUS: ICD-10-CM

## 2025-01-13 DIAGNOSIS — K22.4 DYSKINESIA OF ESOPHAGUS: ICD-10-CM

## 2025-01-13 DIAGNOSIS — R10.12 LEFT UPPER QUADRANT PAIN: ICD-10-CM

## 2025-01-13 DIAGNOSIS — K76.89 OTHER SPECIFIED DISEASES OF LIVER: ICD-10-CM

## 2025-01-13 PROCEDURE — 99214 OFFICE O/P EST MOD 30 MIN: CPT

## 2025-01-13 RX ORDER — OXCARBAZEPINE 600 MG/1
600 TABLET, EXTENDED RELEASE ORAL
Qty: 90 | Refills: 0 | Status: ACTIVE | COMMUNITY
Start: 2024-12-29

## 2025-01-13 RX ORDER — METOPROLOL SUCCINATE 25 MG/1
25 TABLET, EXTENDED RELEASE ORAL
Qty: 90 | Refills: 0 | Status: ACTIVE | COMMUNITY
Start: 2024-09-26

## 2025-01-13 RX ORDER — ATORVASTATIN CALCIUM 20 MG/1
20 TABLET, FILM COATED ORAL
Qty: 90 | Refills: 0 | Status: ACTIVE | COMMUNITY
Start: 2023-12-15

## 2025-01-13 RX ORDER — AMLODIPINE BESYLATE 10 MG/1
10 TABLET ORAL
Qty: 90 | Refills: 0 | Status: ACTIVE | COMMUNITY
Start: 2025-01-11

## 2025-01-13 RX ORDER — DONEPEZIL HYDROCHLORIDE 10 MG/1
10 TABLET ORAL
Qty: 34 | Refills: 0 | Status: ACTIVE | COMMUNITY
Start: 2024-12-27

## 2025-01-13 RX ORDER — DIROXIMEL FUMARATE 231 MG/1
231 CAPSULE ORAL
Qty: 120 | Refills: 0 | Status: ACTIVE | COMMUNITY
Start: 2025-01-10

## 2025-01-24 ENCOUNTER — TRANSCRIPTION ENCOUNTER (OUTPATIENT)
Age: 80
End: 2025-01-24

## 2025-01-24 DIAGNOSIS — N39.0 URINARY TRACT INFECTION, SITE NOT SPECIFIED: ICD-10-CM

## 2025-01-24 LAB
ALBUMIN SERPL ELPH-MCNC: 4.8 G/DL
ALP BLD-CCNC: 96 U/L
ALT SERPL-CCNC: 12 U/L
ANION GAP SERPL CALC-SCNC: 12 MMOL/L
APPEARANCE: ABNORMAL
AST SERPL-CCNC: 15 U/L
BACTERIA: ABNORMAL /HPF
BILIRUB SERPL-MCNC: 0.3 MG/DL
BILIRUBIN URINE: NEGATIVE
BLOOD URINE: ABNORMAL
BUN SERPL-MCNC: 14 MG/DL
CALCIUM SERPL-MCNC: 9.9 MG/DL
CANCER AG125 SERPL-ACNC: 7 U/ML
CANCER AG19-9 SERPL-ACNC: 20 U/ML
CAST: 1 /LPF
CEA SERPL-MCNC: 1.7 NG/ML
CHLORIDE SERPL-SCNC: 103 MMOL/L
CO2 SERPL-SCNC: 28 MMOL/L
COLOR: YELLOW
CREAT SERPL-MCNC: 0.71 MG/DL
CRP SERPL-MCNC: <3 MG/L
EGFR: 87 ML/MIN/1.73M2
EPITHELIAL CELLS: 9 /HPF
GLUCOSE QUALITATIVE U: NEGATIVE MG/DL
GLUCOSE SERPL-MCNC: 88 MG/DL
HCT VFR BLD CALC: 37.9 %
HGB BLD-MCNC: 12.1 G/DL
KETONES URINE: NEGATIVE MG/DL
LEUKOCYTE ESTERASE URINE: ABNORMAL
MCHC RBC-ENTMCNC: 27.9 PG
MCHC RBC-ENTMCNC: 31.9 G/DL
MCV RBC AUTO: 87.3 FL
MICROSCOPIC-UA: NORMAL
NITRITE URINE: NEGATIVE
PH URINE: 6
PLATELET # BLD AUTO: 224 K/UL
POTASSIUM SERPL-SCNC: 4.1 MMOL/L
PROT SERPL-MCNC: 7.2 G/DL
PROTEIN URINE: 30 MG/DL
RBC # BLD: 4.34 M/UL
RBC # FLD: 13.6 %
RED BLOOD CELLS URINE: 2 /HPF
SODIUM SERPL-SCNC: 143 MMOL/L
SPECIFIC GRAVITY URINE: 1.02
TSH SERPL-ACNC: 1.91 UIU/ML
UROBILINOGEN URINE: 1 MG/DL
WBC # FLD AUTO: 4.85 K/UL
WHITE BLOOD CELLS URINE: 198 /HPF

## 2025-01-24 RX ORDER — CIPROFLOXACIN HYDROCHLORIDE 250 MG/1
250 TABLET, FILM COATED ORAL
Qty: 10 | Refills: 0 | Status: ACTIVE | COMMUNITY
Start: 2025-01-24 | End: 1900-01-01

## 2025-02-05 ENCOUNTER — OUTPATIENT (OUTPATIENT)
Dept: OUTPATIENT SERVICES | Facility: HOSPITAL | Age: 80
LOS: 1 days | End: 2025-02-05
Payer: MEDICARE

## 2025-02-05 ENCOUNTER — APPOINTMENT (OUTPATIENT)
Dept: ULTRASOUND IMAGING | Facility: IMAGING CENTER | Age: 80
End: 2025-02-05
Payer: MEDICARE

## 2025-02-05 DIAGNOSIS — K76.89 OTHER SPECIFIED DISEASES OF LIVER: ICD-10-CM

## 2025-02-05 PROCEDURE — 76700 US EXAM ABDOM COMPLETE: CPT

## 2025-02-05 PROCEDURE — 76700 US EXAM ABDOM COMPLETE: CPT | Mod: 26

## 2025-02-10 ENCOUNTER — RX RENEWAL (OUTPATIENT)
Age: 80
End: 2025-02-10

## 2025-03-03 ENCOUNTER — APPOINTMENT (OUTPATIENT)
Dept: GASTROENTEROLOGY | Facility: CLINIC | Age: 80
End: 2025-03-03

## 2025-03-03 DIAGNOSIS — N39.0 URINARY TRACT INFECTION, SITE NOT SPECIFIED: ICD-10-CM

## 2025-03-03 PROCEDURE — G2211 COMPLEX E/M VISIT ADD ON: CPT | Mod: 2W

## 2025-03-03 PROCEDURE — 99213 OFFICE O/P EST LOW 20 MIN: CPT | Mod: 2W

## 2025-03-03 RX ORDER — NUT.SUP,SPEC,LAC-FREE,IRON/FOS 0.08G-2/ML
LIQUID (ML) ORAL
Qty: 180 | Refills: 3 | Status: ACTIVE | COMMUNITY
Start: 2025-03-03 | End: 1900-01-01

## 2025-03-26 PROBLEM — G35 HISTORY OF MULTIPLE SCLEROSIS: Status: RESOLVED | Noted: 2024-05-29 | Resolved: 2025-03-26
